# Patient Record
Sex: MALE | Race: OTHER | NOT HISPANIC OR LATINO | URBAN - METROPOLITAN AREA
[De-identification: names, ages, dates, MRNs, and addresses within clinical notes are randomized per-mention and may not be internally consistent; named-entity substitution may affect disease eponyms.]

---

## 2017-08-12 ENCOUNTER — INPATIENT (INPATIENT)
Facility: HOSPITAL | Age: 82
LOS: 5 days | Discharge: HOME | End: 2017-08-18
Attending: INTERNAL MEDICINE | Admitting: INTERNAL MEDICINE
Payer: MEDICAID

## 2017-08-12 VITALS
HEART RATE: 84 BPM | OXYGEN SATURATION: 100 % | DIASTOLIC BLOOD PRESSURE: 85 MMHG | SYSTOLIC BLOOD PRESSURE: 126 MMHG | TEMPERATURE: 98 F | RESPIRATION RATE: 16 BRPM

## 2017-08-12 LAB
ALBUMIN SERPL ELPH-MCNC: 4.4 G/DL — SIGNIFICANT CHANGE UP (ref 3.3–5)
ALP SERPL-CCNC: 95 U/L — SIGNIFICANT CHANGE UP (ref 40–120)
ALT FLD-CCNC: 20 U/L — SIGNIFICANT CHANGE UP (ref 4–41)
APTT BLD: 44.6 SEC — HIGH (ref 27.5–37.4)
AST SERPL-CCNC: 44 U/L — HIGH (ref 4–40)
BASE EXCESS BLDV CALC-SCNC: -7.4 MMOL/L — SIGNIFICANT CHANGE UP
BASOPHILS # BLD AUTO: 0.04 K/UL — SIGNIFICANT CHANGE UP (ref 0–0.2)
BASOPHILS NFR BLD AUTO: 0.7 % — SIGNIFICANT CHANGE UP (ref 0–2)
BILIRUB SERPL-MCNC: 2.4 MG/DL — HIGH (ref 0.2–1.2)
BLOOD GAS VENOUS - CREATININE: 1.32 MG/DL — HIGH (ref 0.5–1.3)
BUN SERPL-MCNC: 26 MG/DL — HIGH (ref 7–23)
CALCIUM SERPL-MCNC: 9.1 MG/DL — SIGNIFICANT CHANGE UP (ref 8.4–10.5)
CHLORIDE BLDV-SCNC: 102 MMOL/L — SIGNIFICANT CHANGE UP (ref 96–108)
CHLORIDE SERPL-SCNC: 96 MMOL/L — LOW (ref 98–107)
CK MB BLD-MCNC: 3.58 NG/ML — SIGNIFICANT CHANGE UP (ref 1–6.6)
CK MB BLD-MCNC: SIGNIFICANT CHANGE UP (ref 0–2.5)
CK SERPL-CCNC: 119 U/L — SIGNIFICANT CHANGE UP (ref 30–200)
CO2 SERPL-SCNC: 17 MMOL/L — LOW (ref 22–31)
CREAT SERPL-MCNC: 1.4 MG/DL — HIGH (ref 0.5–1.3)
D DIMER BLD IA.RAPID-MCNC: 702 NG/ML — SIGNIFICANT CHANGE UP
EOSINOPHIL # BLD AUTO: 0.59 K/UL — HIGH (ref 0–0.5)
EOSINOPHIL NFR BLD AUTO: 9.9 % — HIGH (ref 0–6)
GAS PNL BLDV: 131 MMOL/L — LOW (ref 136–146)
GLUCOSE BLDV-MCNC: 147 — HIGH (ref 70–99)
GLUCOSE SERPL-MCNC: 134 MG/DL — HIGH (ref 70–99)
HCO3 BLDV-SCNC: 17 MMOL/L — LOW (ref 20–27)
HCT VFR BLD CALC: 33.5 % — LOW (ref 39–50)
HCT VFR BLDV CALC: 31.8 % — LOW (ref 39–51)
HGB BLD-MCNC: 9.9 G/DL — LOW (ref 13–17)
HGB BLDV-MCNC: 10.3 G/DL — LOW (ref 13–17)
IMM GRANULOCYTES # BLD AUTO: 0.02 # — SIGNIFICANT CHANGE UP
IMM GRANULOCYTES NFR BLD AUTO: 0.3 % — SIGNIFICANT CHANGE UP (ref 0–1.5)
INR BLD: 1.63 — HIGH (ref 0.88–1.17)
LACTATE BLDV-MCNC: 3.6 MMOL/L — HIGH (ref 0.5–2)
LYMPHOCYTES # BLD AUTO: 1.12 K/UL — SIGNIFICANT CHANGE UP (ref 1–3.3)
LYMPHOCYTES # BLD AUTO: 18.9 % — SIGNIFICANT CHANGE UP (ref 13–44)
MCHC RBC-ENTMCNC: 24.9 PG — LOW (ref 27–34)
MCHC RBC-ENTMCNC: 29.6 % — LOW (ref 32–36)
MCV RBC AUTO: 84.2 FL — SIGNIFICANT CHANGE UP (ref 80–100)
MONOCYTES # BLD AUTO: 1.09 K/UL — HIGH (ref 0–0.9)
MONOCYTES NFR BLD AUTO: 18.4 % — HIGH (ref 2–14)
NEUTROPHILS # BLD AUTO: 3.08 K/UL — SIGNIFICANT CHANGE UP (ref 1.8–7.4)
NEUTROPHILS NFR BLD AUTO: 51.8 % — SIGNIFICANT CHANGE UP (ref 43–77)
NRBC # FLD: 0.02 — SIGNIFICANT CHANGE UP
NT-PROBNP SERPL-SCNC: 3036 PG/ML — SIGNIFICANT CHANGE UP
PCO2 BLDV: 50 MMHG — SIGNIFICANT CHANGE UP (ref 41–51)
PH BLDV: 7.21 PH — LOW (ref 7.32–7.43)
PLATELET # BLD AUTO: 148 K/UL — LOW (ref 150–400)
PMV BLD: 11.2 FL — SIGNIFICANT CHANGE UP (ref 7–13)
PO2 BLDV: 25 MMHG — LOW (ref 35–40)
POTASSIUM BLDV-SCNC: 4.4 MMOL/L — SIGNIFICANT CHANGE UP (ref 3.4–4.5)
POTASSIUM SERPL-MCNC: 4.8 MMOL/L — SIGNIFICANT CHANGE UP (ref 3.5–5.3)
POTASSIUM SERPL-SCNC: 4.8 MMOL/L — SIGNIFICANT CHANGE UP (ref 3.5–5.3)
PROT SERPL-MCNC: 8.8 G/DL — HIGH (ref 6–8.3)
PROTHROM AB SERPL-ACNC: 18.4 SEC — HIGH (ref 9.8–13.1)
RBC # BLD: 3.98 M/UL — LOW (ref 4.2–5.8)
RBC # FLD: 18 % — HIGH (ref 10.3–14.5)
REVIEW TO FOLLOW: YES — SIGNIFICANT CHANGE UP
SAO2 % BLDV: 26.2 % — LOW (ref 60–85)
SODIUM SERPL-SCNC: 130 MMOL/L — LOW (ref 135–145)
TROPONIN T SERPL-MCNC: < 0.06 NG/ML — SIGNIFICANT CHANGE UP (ref 0–0.06)
WBC # BLD: 5.94 K/UL — SIGNIFICANT CHANGE UP (ref 3.8–10.5)
WBC # FLD AUTO: 5.94 K/UL — SIGNIFICANT CHANGE UP (ref 3.8–10.5)

## 2017-08-12 PROCEDURE — 71010: CPT | Mod: 26

## 2017-08-12 RX ORDER — HEPARIN SODIUM 5000 [USP'U]/ML
4500 INJECTION INTRAVENOUS; SUBCUTANEOUS EVERY 6 HOURS
Qty: 0 | Refills: 0 | Status: DISCONTINUED | OUTPATIENT
Start: 2017-08-12 | End: 2017-08-13

## 2017-08-12 RX ORDER — HEPARIN SODIUM 5000 [USP'U]/ML
2000 INJECTION INTRAVENOUS; SUBCUTANEOUS EVERY 6 HOURS
Qty: 0 | Refills: 0 | Status: DISCONTINUED | OUTPATIENT
Start: 2017-08-12 | End: 2017-08-13

## 2017-08-12 RX ORDER — IPRATROPIUM/ALBUTEROL SULFATE 18-103MCG
3 AEROSOL WITH ADAPTER (GRAM) INHALATION ONCE
Qty: 0 | Refills: 0 | Status: COMPLETED | OUTPATIENT
Start: 2017-08-12 | End: 2017-08-12

## 2017-08-12 RX ORDER — ASPIRIN/CALCIUM CARB/MAGNESIUM 324 MG
325 TABLET ORAL ONCE
Qty: 0 | Refills: 0 | Status: COMPLETED | OUTPATIENT
Start: 2017-08-12 | End: 2017-08-12

## 2017-08-12 RX ORDER — FUROSEMIDE 40 MG
80 TABLET ORAL ONCE
Qty: 0 | Refills: 0 | Status: COMPLETED | OUTPATIENT
Start: 2017-08-12 | End: 2017-08-12

## 2017-08-12 RX ORDER — HEPARIN SODIUM 5000 [USP'U]/ML
INJECTION INTRAVENOUS; SUBCUTANEOUS
Qty: 25000 | Refills: 0 | Status: DISCONTINUED | OUTPATIENT
Start: 2017-08-12 | End: 2017-08-13

## 2017-08-12 RX ADMIN — HEPARIN SODIUM 1100 UNIT(S)/HR: 5000 INJECTION INTRAVENOUS; SUBCUTANEOUS at 23:58

## 2017-08-12 RX ADMIN — Medication 325 MILLIGRAM(S): at 23:58

## 2017-08-12 RX ADMIN — Medication 3 MILLILITER(S): at 21:48

## 2017-08-12 RX ADMIN — Medication 80 MILLIGRAM(S): at 22:31

## 2017-08-12 NOTE — ED PROVIDER NOTE - PROGRESS NOTE DETAILS
son phone number: [phone] JW Pt has signs and symptoms of CHF.  BNP >3000, AFIB with no prior history.  Pt at a normal rate.  Discussed with Dr. Li PE unlikely.  Pt admitted to telemetry.  Tele PA text paged.

## 2017-08-12 NOTE — ED PROVIDER NOTE - OBJECTIVE STATEMENT
89 YOM PMH CHF, liver disease NOS, HTN, p/w SOB [place] the [place] one [place] ago from [place]. Sx gradual onset with associated abdominal distension, leg swelling, STACK, and orthopnea.  No chest pain, headache, nausea, vomiting.  No fevers, chills, sweats, weight loss, fatigue, or malaise.

## 2017-08-12 NOTE — ED ADULT NURSE NOTE - CHIEF COMPLAINT QUOTE
Pt comes in for c/o CP/SOB  and in ability to ambulate x3 day. Pt appears in no acute distress, some mild wheezing noted in triage. Family translating for pt and reports there pt arrived from [place] 3 wks ago and was being treated for multiple things there. Pt vs as noted, EKG in completed.

## 2017-08-12 NOTE — ED ADULT NURSE NOTE - OBJECTIVE STATEMENT
89 yo male received in spot 7.  Pt is A&Ox4.  Pt is pavel only speaking.  Translation service offered, family prefers to translate.  Pt c/o CP/SOB for 1 week.  Mild wheezing noted in upper fields.  Pt arrived from [place] 3 wks ago and was being treated for multiple things there.  Pt has multiple medications with that he is not complaint with.  Poor historian of health.  Per family pt has CHF and liver cirrhosis.  Pos 2+ pedal edema.  Pt 18G L ac.  Pt vs as noted,

## 2017-08-12 NOTE — ED PROVIDER NOTE - ATTENDING CONTRIBUTION TO CARE
agree with resident note  89 YOM PMH CHF, liver disease NOS, afib, HTN, p/w SOB.  Recently emigrated from [place] approx 3 weeks and per son has these occassional episodes of SOB "when he gets sick".  States retaining fluid in legs, endorses prior hx of liver disease likely from ETOh.  Pt does endorse a chronic abdominal distension.    PE: initially dyspneic, tachypneic, required immediate oxygen supplementation but when placed on bed and allowed to rest symptoms abated; normotensive (unclear if hypertensive if baseline cirrhotic); tachycardic; rales at bases; abd distended but soft ext: 2+ edema

## 2017-08-12 NOTE — ED PROVIDER NOTE - MEDICAL DECISION MAKING DETAILS
89 YOM PMH CHF, liver disease NOS, HTN, p/w SOB [place] the [place] one [place] ago from [place]. VSS WNL.  Likely CHF given Hx and exam findings.  R/O ACS, evaluate for CHF, evaluate for PE, treat symptomatically.  Admit.

## 2017-08-13 DIAGNOSIS — I48.91 UNSPECIFIED ATRIAL FIBRILLATION: ICD-10-CM

## 2017-08-13 DIAGNOSIS — I50.9 HEART FAILURE, UNSPECIFIED: ICD-10-CM

## 2017-08-13 DIAGNOSIS — Z29.9 ENCOUNTER FOR PROPHYLACTIC MEASURES, UNSPECIFIED: ICD-10-CM

## 2017-08-13 LAB
ANISOCYTOSIS BLD QL: SLIGHT — SIGNIFICANT CHANGE UP
APTT BLD: 162.8 SEC — CRITICAL HIGH (ref 27.5–37.4)
BASOPHILS NFR SPEC: 2.6 % — HIGH (ref 0–2)
BUN SERPL-MCNC: 26 MG/DL — HIGH (ref 7–23)
CALCIUM SERPL-MCNC: 9 MG/DL — SIGNIFICANT CHANGE UP (ref 8.4–10.5)
CHLORIDE SERPL-SCNC: 98 MMOL/L — SIGNIFICANT CHANGE UP (ref 98–107)
CHOLEST SERPL-MCNC: 111 MG/DL — LOW (ref 120–199)
CK SERPL-CCNC: 94 U/L — SIGNIFICANT CHANGE UP (ref 30–200)
CO2 SERPL-SCNC: 16 MMOL/L — LOW (ref 22–31)
CREAT SERPL-MCNC: 1.39 MG/DL — HIGH (ref 0.5–1.3)
EOSINOPHIL NFR FLD: 5.2 % — SIGNIFICANT CHANGE UP (ref 0–6)
GIANT PLATELETS BLD QL SMEAR: PRESENT — SIGNIFICANT CHANGE UP
GLUCOSE SERPL-MCNC: 116 MG/DL — HIGH (ref 70–99)
HBA1C BLD-MCNC: 7.6 % — HIGH (ref 4–5.6)
HCT VFR BLD CALC: 29.1 % — LOW (ref 39–50)
HCT VFR BLD CALC: 29.6 % — LOW (ref 39–50)
HDLC SERPL-MCNC: 38 MG/DL — SIGNIFICANT CHANGE UP (ref 35–55)
HGB BLD-MCNC: 8.7 G/DL — LOW (ref 13–17)
HGB BLD-MCNC: 8.8 G/DL — LOW (ref 13–17)
HYPOCHROMIA BLD QL: SIGNIFICANT CHANGE UP
LIPID PNL WITH DIRECT LDL SERPL: 70 MG/DL — SIGNIFICANT CHANGE UP
LYMPHOCYTES NFR SPEC AUTO: 21.8 % — SIGNIFICANT CHANGE UP (ref 13–44)
MAGNESIUM SERPL-MCNC: 2 MG/DL — SIGNIFICANT CHANGE UP (ref 1.6–2.6)
MCHC RBC-ENTMCNC: 24.5 PG — LOW (ref 27–34)
MCHC RBC-ENTMCNC: 24.8 PG — LOW (ref 27–34)
MCHC RBC-ENTMCNC: 29.7 % — LOW (ref 32–36)
MCHC RBC-ENTMCNC: 29.9 % — LOW (ref 32–36)
MCV RBC AUTO: 82.5 FL — SIGNIFICANT CHANGE UP (ref 80–100)
MCV RBC AUTO: 82.9 FL — SIGNIFICANT CHANGE UP (ref 80–100)
MONOCYTES NFR BLD: 13.9 % — HIGH (ref 2–9)
NEUTROPHIL AB SER-ACNC: 56.5 % — SIGNIFICANT CHANGE UP (ref 43–77)
NRBC # FLD: 0 — SIGNIFICANT CHANGE UP
NRBC # FLD: 0 — SIGNIFICANT CHANGE UP
NT-PROBNP SERPL-SCNC: 2959 PG/ML — SIGNIFICANT CHANGE UP
OB PNL STL: NEGATIVE — SIGNIFICANT CHANGE UP
OB PNL STL: NEGATIVE — SIGNIFICANT CHANGE UP
PHOSPHATE SERPL-MCNC: 3.8 MG/DL — SIGNIFICANT CHANGE UP (ref 2.5–4.5)
PLATELET # BLD AUTO: 135 K/UL — LOW (ref 150–400)
PLATELET # BLD AUTO: 139 K/UL — LOW (ref 150–400)
PLATELET COUNT - ESTIMATE: SIGNIFICANT CHANGE UP
PMV BLD: 11 FL — SIGNIFICANT CHANGE UP (ref 7–13)
PMV BLD: 11 FL — SIGNIFICANT CHANGE UP (ref 7–13)
POIKILOCYTOSIS BLD QL AUTO: SLIGHT — SIGNIFICANT CHANGE UP
POLYCHROMASIA BLD QL SMEAR: SLIGHT — SIGNIFICANT CHANGE UP
POTASSIUM SERPL-MCNC: 4.2 MMOL/L — SIGNIFICANT CHANGE UP (ref 3.5–5.3)
POTASSIUM SERPL-SCNC: 4.2 MMOL/L — SIGNIFICANT CHANGE UP (ref 3.5–5.3)
RBC # BLD: 3.51 M/UL — LOW (ref 4.2–5.8)
RBC # BLD: 3.59 M/UL — LOW (ref 4.2–5.8)
RBC # FLD: 17.9 % — HIGH (ref 10.3–14.5)
RBC # FLD: 18 % — HIGH (ref 10.3–14.5)
SODIUM SERPL-SCNC: 132 MMOL/L — LOW (ref 135–145)
TRIGL SERPL-MCNC: 50 MG/DL — SIGNIFICANT CHANGE UP (ref 10–149)
TROPONIN T SERPL-MCNC: < 0.06 NG/ML — SIGNIFICANT CHANGE UP (ref 0–0.06)
TSH SERPL-MCNC: 2.24 UIU/ML — SIGNIFICANT CHANGE UP (ref 0.27–4.2)
WBC # BLD: 5.53 K/UL — SIGNIFICANT CHANGE UP (ref 3.8–10.5)
WBC # BLD: 5.55 K/UL — SIGNIFICANT CHANGE UP (ref 3.8–10.5)
WBC # FLD AUTO: 5.53 K/UL — SIGNIFICANT CHANGE UP (ref 3.8–10.5)
WBC # FLD AUTO: 5.55 K/UL — SIGNIFICANT CHANGE UP (ref 3.8–10.5)

## 2017-08-13 RX ORDER — HEPARIN SODIUM 5000 [USP'U]/ML
5500 INJECTION INTRAVENOUS; SUBCUTANEOUS EVERY 6 HOURS
Qty: 0 | Refills: 0 | Status: DISCONTINUED | OUTPATIENT
Start: 2017-08-13 | End: 2017-08-15

## 2017-08-13 RX ORDER — DEXTROSE 50 % IN WATER 50 %
12.5 SYRINGE (ML) INTRAVENOUS ONCE
Qty: 0 | Refills: 0 | Status: DISCONTINUED | OUTPATIENT
Start: 2017-08-13 | End: 2017-08-18

## 2017-08-13 RX ORDER — HEPARIN SODIUM 5000 [USP'U]/ML
900 INJECTION INTRAVENOUS; SUBCUTANEOUS
Qty: 25000 | Refills: 0 | Status: DISCONTINUED | OUTPATIENT
Start: 2017-08-13 | End: 2017-08-13

## 2017-08-13 RX ORDER — SODIUM CHLORIDE 9 MG/ML
1000 INJECTION, SOLUTION INTRAVENOUS
Qty: 0 | Refills: 0 | Status: DISCONTINUED | OUTPATIENT
Start: 2017-08-13 | End: 2017-08-18

## 2017-08-13 RX ORDER — METOPROLOL TARTRATE 50 MG
25 TABLET ORAL
Qty: 0 | Refills: 0 | Status: DISCONTINUED | OUTPATIENT
Start: 2017-08-13 | End: 2017-08-14

## 2017-08-13 RX ORDER — DEXTROSE 50 % IN WATER 50 %
1 SYRINGE (ML) INTRAVENOUS ONCE
Qty: 0 | Refills: 0 | Status: DISCONTINUED | OUTPATIENT
Start: 2017-08-13 | End: 2017-08-18

## 2017-08-13 RX ORDER — IPRATROPIUM/ALBUTEROL SULFATE 18-103MCG
3 AEROSOL WITH ADAPTER (GRAM) INHALATION ONCE
Qty: 0 | Refills: 0 | Status: COMPLETED | OUTPATIENT
Start: 2017-08-13 | End: 2017-08-13

## 2017-08-13 RX ORDER — GLUCAGON INJECTION, SOLUTION 0.5 MG/.1ML
1 INJECTION, SOLUTION SUBCUTANEOUS ONCE
Qty: 0 | Refills: 0 | Status: DISCONTINUED | OUTPATIENT
Start: 2017-08-13 | End: 2017-08-18

## 2017-08-13 RX ORDER — INSULIN LISPRO 100/ML
VIAL (ML) SUBCUTANEOUS AT BEDTIME
Qty: 0 | Refills: 0 | Status: DISCONTINUED | OUTPATIENT
Start: 2017-08-13 | End: 2017-08-18

## 2017-08-13 RX ORDER — SODIUM CHLORIDE 9 MG/ML
3 INJECTION INTRAMUSCULAR; INTRAVENOUS; SUBCUTANEOUS EVERY 8 HOURS
Qty: 0 | Refills: 0 | Status: DISCONTINUED | OUTPATIENT
Start: 2017-08-13 | End: 2017-08-18

## 2017-08-13 RX ORDER — HEPARIN SODIUM 5000 [USP'U]/ML
2500 INJECTION INTRAVENOUS; SUBCUTANEOUS EVERY 6 HOURS
Qty: 0 | Refills: 0 | Status: DISCONTINUED | OUTPATIENT
Start: 2017-08-13 | End: 2017-08-15

## 2017-08-13 RX ORDER — DOCUSATE SODIUM 100 MG
100 CAPSULE ORAL THREE TIMES A DAY
Qty: 0 | Refills: 0 | Status: DISCONTINUED | OUTPATIENT
Start: 2017-08-13 | End: 2017-08-18

## 2017-08-13 RX ORDER — INSULIN LISPRO 100/ML
VIAL (ML) SUBCUTANEOUS
Qty: 0 | Refills: 0 | Status: DISCONTINUED | OUTPATIENT
Start: 2017-08-13 | End: 2017-08-18

## 2017-08-13 RX ORDER — DEXTROSE 50 % IN WATER 50 %
25 SYRINGE (ML) INTRAVENOUS ONCE
Qty: 0 | Refills: 0 | Status: DISCONTINUED | OUTPATIENT
Start: 2017-08-13 | End: 2017-08-18

## 2017-08-13 RX ORDER — POLYETHYLENE GLYCOL 3350 17 G/17G
17 POWDER, FOR SOLUTION ORAL DAILY
Qty: 0 | Refills: 0 | Status: DISCONTINUED | OUTPATIENT
Start: 2017-08-13 | End: 2017-08-15

## 2017-08-13 RX ORDER — FUROSEMIDE 40 MG
20 TABLET ORAL EVERY 12 HOURS
Qty: 0 | Refills: 0 | Status: DISCONTINUED | OUTPATIENT
Start: 2017-08-13 | End: 2017-08-15

## 2017-08-13 RX ORDER — ASPIRIN/CALCIUM CARB/MAGNESIUM 324 MG
81 TABLET ORAL DAILY
Qty: 0 | Refills: 0 | Status: DISCONTINUED | OUTPATIENT
Start: 2017-08-13 | End: 2017-08-18

## 2017-08-13 RX ADMIN — SODIUM CHLORIDE 3 MILLILITER(S): 9 INJECTION INTRAMUSCULAR; INTRAVENOUS; SUBCUTANEOUS at 05:47

## 2017-08-13 RX ADMIN — Medication 5 MILLIGRAM(S): at 21:47

## 2017-08-13 RX ADMIN — Medication 20 MILLIGRAM(S): at 17:23

## 2017-08-13 RX ADMIN — Medication 20 MILLIGRAM(S): at 08:32

## 2017-08-13 RX ADMIN — HEPARIN SODIUM 900 UNIT(S)/HR: 5000 INJECTION INTRAVENOUS; SUBCUTANEOUS at 09:05

## 2017-08-13 RX ADMIN — HEPARIN SODIUM 0 UNIT(S)/HR: 5000 INJECTION INTRAVENOUS; SUBCUTANEOUS at 07:24

## 2017-08-13 RX ADMIN — Medication 25 MILLIGRAM(S): at 17:23

## 2017-08-13 RX ADMIN — Medication 81 MILLIGRAM(S): at 12:10

## 2017-08-13 RX ADMIN — SODIUM CHLORIDE 3 MILLILITER(S): 9 INJECTION INTRAMUSCULAR; INTRAVENOUS; SUBCUTANEOUS at 21:46

## 2017-08-13 RX ADMIN — SODIUM CHLORIDE 3 MILLILITER(S): 9 INJECTION INTRAMUSCULAR; INTRAVENOUS; SUBCUTANEOUS at 12:08

## 2017-08-13 RX ADMIN — Medication 100 MILLIGRAM(S): at 14:46

## 2017-08-13 RX ADMIN — Medication 3 MILLILITER(S): at 03:04

## 2017-08-13 RX ADMIN — Medication 100 MILLIGRAM(S): at 21:47

## 2017-08-13 RX ADMIN — Medication 25 MILLIGRAM(S): at 07:31

## 2017-08-13 NOTE — CHART NOTE - NSCHARTNOTEFT_GEN_A_CORE
[facility] called for anemia and elevated INR (not on coumadin per family). rectal exam requested by GI, done at bedside minimal stool in the rectum, sample sent for occult blood. Unable to appreciate for prostate on the exam. Will sent occult

## 2017-08-13 NOTE — PROVIDER CONTACT NOTE (CRITICAL VALUE NOTIFICATION) - BACKGROUND
(Admit Diagnosis) Heart failure  (PMH) CHF (congestive heart failure)  (PMH) Liver disease  (Principal DC/DX) CHF exacerbation

## 2017-08-13 NOTE — H&P ADULT - PROBLEM SELECTOR PLAN 1
CM  F/u CE, EKG, TTE  Monitor I's O's daily weights and Lytes  Give O2, Zaroxolyn 2.5 mg po 30 minutes prior Lasix 20 mg IV BID   F/u Pt and Dietary consults  ASpiration precautions

## 2017-08-13 NOTE — H&P ADULT - RS GEN PE MLT RESP DETAILS PC
rales/clear to auscultation bilaterally/airway patent/good air movement/respirations non-labored/breath sounds equal

## 2017-08-13 NOTE — CONSULT NOTE ADULT - SUBJECTIVE AND OBJECTIVE BOX
Chief Complaint:  Patient is a 89y old  Male who presents with a chief complaint of SOB X 1 monthe (13 Aug 2017 02:07)    HPI: 88yo M with history of CHF, ?cirrhosis (diagnosed and managed in [place]), HTN, who [place] and presented to the ED with shortness of breath, early satiety, lower extremity edema, and abdominal distention and pain. His son is at bedside and explained that his father has been treated for similar symptoms in hospitals in [facility] and in [place] (not at this hospital) very often, approx every 3 months.  He is unclear what treatments and evaluations the patient has had but he has been told he has a weak liver and weak heart.  He states he has never undergone EGD or colonoscopy.  He has no history of GI bleeding (as far as the son knows).  Last bowel movement at 10pm last evening, brown liquid.   Concern on labs for elevated INR and PTT, mild thrombocytopenia, and anemia, for which GI was consulted.  Rectal exam performed by PA prior to my exam was negative for melena (and FOBT negative).    Allergies:  No Known Allergies      Home Medications:    Hospital Medications:  sodium chloride 0.9% lock flush 3 milliLiter(s) IV Push every 8 hours  furosemide   Injectable 20 milliGRAM(s) IV Push every 12 hours  aspirin  chewable 81 milliGRAM(s) Oral daily  metoprolol 25 milliGRAM(s) Oral two times a day  heparin  Injectable 5500 Unit(s) IV Push every 6 hours PRN  heparin  Injectable 2500 Unit(s) IV Push every 6 hours PRN  docusate sodium 100 milliGRAM(s) Oral three times a day  polyethylene glycol 3350 17 Gram(s) Oral daily PRN  guaiFENesin    Syrup 100 milliGRAM(s) Oral every 6 hours PRN  insulin lispro (HumaLOG) corrective regimen sliding scale   SubCutaneous three times a day before meals  insulin lispro (HumaLOG) corrective regimen sliding scale   SubCutaneous at bedtime  dextrose 5%. 1000 milliLiter(s) IV Continuous <Continuous>  dextrose Gel 1 Dose(s) Oral once PRN  dextrose 50% Injectable 12.5 Gram(s) IV Push once  dextrose 50% Injectable 25 Gram(s) IV Push once  dextrose 50% Injectable 25 Gram(s) IV Push once  glucagon  Injectable 1 milliGRAM(s) IntraMuscular once PRN  bisacodyl 5 milliGRAM(s) Oral every 12 hours PRN      PMHX/PSHX:  Liver disease  CHF (congestive heart failure)  No significant past surgical history      Family history:  No pertinent family history      Social History: former ETOH use, no tobacco or drugs.  From [place].    ROS:     General:  No wt loss, fevers, chills, night sweats, fatigue,   Eyes:  Good vision, no reported pain  ENT:  No sore throat, pain, runny nose, dysphagia  CV:  No pain, palpitations, hypo/hypertension  Resp:  No dyspnea, cough, tachypnea, wheezing  GI:  See HPI  :  No pain, bleeding, incontinence, nocturia  Muscle:  No pain, weakness  Neuro:  No weakness, tingling, memory problems  Psych:  No fatigue, insomnia, mood problems, depression  Endocrine:  No polyuria, polydipsia, cold/heat intolerance  Heme:  No petechiae, ecchymosis, easy bruisability  Skin:  No rash, edema      PHYSICAL EXAM:     GENERAL:  Appears stated age, well-groomed, well-nourished, no distress  HEENT:  NC/AT,  conjunctivae clear and pink,  elevated JVD and distended external jugulars  CHEST:  Full & symmetric excursion, no increased effort, breath sounds clear  HEART:  Regular rhythm, S1, S2, no murmur/rub/S3/S4, no abdominal bruit, no edema  ABDOMEN:  Soft, distended, mildly TTP in epigastrium , normal BS  EXTREMITIES:  3+ bilateral lower extremity edema  SKIN:  No rash/erythema/ecchymoses/petechiae/wounds/abscess/warm/dry  NEURO:  Alert, oriented    Vital Signs:  Vital Signs Last 24 Hrs  T(C): 36.7 (13 Aug 2017 18:07), Max: 36.8 (12 Aug 2017 22:37)  T(F): 98 (13 Aug 2017 18:07), Max: 98.2 (12 Aug 2017 22:37)  HR: 70 (13 Aug 2017 18:07) (68 - 88)  BP: 120/77 (13 Aug 2017 18:07) (102/77 - 130/80)  BP(mean): --  RR: 18 (13 Aug 2017 18:07) (16 - 18)  SpO2: 98% (13 Aug 2017 18:07) (98% - 100%)  Daily Height in cm: 167.64 (13 Aug 2017 13:37)    Daily Weight in k.2 (13 Aug 2017 05:46)    LABS:                        8.8    5.55  )-----------( 139      ( 13 Aug 2017 11:15 )             29.6     08    132<L>  |  98  |  26<H>  ----------------------------<  116<H>  4.2   |  16<L>  |  1.39<H>    Ca    9.0      13 Aug 2017 05:30  Phos  3.8       Mg     2.0         TPro  8.8<H>  /  Alb  4.4  /  TBili  2.4<H>  /  DBili  x   /  AST  44<H>  /  ALT  20  /  AlkPhos  95  0812    LIVER FUNCTIONS - ( 12 Aug 2017 21:15 )  Alb: 4.4 g/dL / Pro: 8.8 g/dL / ALK PHOS: 95 u/L / ALT: 20 u/L / AST: 44 u/L / GGT: x           PT/INR - ( 12 Aug 2017 21:15 )   PT: 18.4 SEC;   INR: 1.63          PTT - ( 13 Aug 2017 05:30 )  PTT:162.8 SEC        Imaging:      CXR with pulm edema

## 2017-08-13 NOTE — PATIENT PROFILE ADULT. - INTERPRETER'S NAME
Rojelio states that there are no [facility] interpreters available at this time, will try to call after 0500 as advised   Rojelio states that there are no [facility] interpreters available at this time, will try to call in the morning as advised

## 2017-08-13 NOTE — PHYSICAL THERAPY INITIAL EVALUATION ADULT - GENERAL OBSERVATIONS, REHAB EVAL
Patient received sitting in a chair, in NAD. + Abdominal distension and Bilateral LE hyperpigmentation Patient received sitting in a chair, in NAD. + Abdominal distension and Bilateral LE hyperpigmentation. (Language Line used for Antwon: [id number])

## 2017-08-13 NOTE — CONSULT NOTE ADULT - ATTENDING COMMENTS
An 89 year-old man with dyspnea, leg edema, new onset atrial fibrillation and  heart failure is being consulted for cryptogenic cirrhosis and abnormal liver tests and prolonged INR.   Patient is awake, alert and oriented x3. He has ascites.   Will recommend workup for chronic liver disease and cirrhosis, an abdominal US with doppler to rule out PVT, and  BNP, diagnostic paracentesis to calculate SAAG and total protein, and an echocardiogram to rule out right heart dysfunction and congestive hepatopathy or cardiac cirrhosis.

## 2017-08-13 NOTE — H&P ADULT - HISTORY OF PRESENT ILLNESS
89M with a history of CHF, liver disease, HTN, experiencing SOB recently [place] the [place] one [place] ago from [place]. The symptoms had gradual onset with associated abdominal distension, B/L leg swelling, STACK, and orthopnea.  No chest pain, headache, nausea, vomiting.  No fevers, chills, sweats, weight loss, fatigue, or malaise.    In th Ed, the pt is found to be in new set rate controlled A fib and fluid over load.  The pt is sated on Lasix IV and a Heparin drip

## 2017-08-13 NOTE — PHYSICAL THERAPY INITIAL EVALUATION ADULT - PERTINENT HX OF CURRENT PROBLEM, REHAB EVAL
89M with a history of CHF, liver disease, HTN, experiencing SOB recently [place] the [place] one [place] ago from [place]. The symptoms had gradual onset with associated abdominal distension, B/L leg swelling, STACK, and orthopnea.

## 2017-08-13 NOTE — CONSULT NOTE ADULT - ASSESSMENT
Impression:  1) Normocytic anemia - no overt GI bleeding, likely in setting of chronic inflammation  2) Volume overload - ddx includes decompensated heart failure vs less likely decompensated cirrhosis  3) Elevated liver enzymes and transaminsases - could be congestive hepatopathy vs 2/2 cirrhosis  4) cirrhosis - possibly ETOH but do not know etiology or extent of disease  5) new onset afib    Recs:  - check iron panel, ferritin for anemia eval  - do not think patient is having occult GI bleeding but may benefit from EGD/colon given history of cirrhosis and never having had in past, not urgent.  Will discuss in AM  - would optimize volume status with diuresis  - cardiac eval for dx of CHF and afib  - would followup abdominal US to eval liver, ascites  - can check hepatitis panel HbsAg, HbcAb, HbsAb, HCV Ab, HAV IgM/IgG, but would consider outpatient management of chronic liver disease  - Avoid hepatotoxic agents  - Daily INR/CMP

## 2017-08-13 NOTE — H&P ADULT - NSHPLABSRESULTS_GEN_ALL_CORE
CXR Left retrocardiac opacity may represent atelectasis or infection  EKG A fib @ 84b/ min, RBBB, Q wave 2,3, AVF  On HEPARIN DRIP   On LASIX IV  H & H = 9.9/ 33.5  PT/ INR/ PTT= 18.4/ 1.63/ 44.6  D DDimer= 702  Na/ Cl= 130/ 96  BUN/ creatinine= 26/ 1.4  Glucose = 131  Ce negative x 1  ProBNP = 3036

## 2017-08-13 NOTE — PHYSICAL THERAPY INITIAL EVALUATION ADULT - DISCHARGE DISPOSITION, PT EVAL
Anticipate Home PT with [facility] pending further improvements in gait and stair training for patient safety

## 2017-08-13 NOTE — H&P ADULT - PROBLEM SELECTOR PLAN 2
Rate controlled  Will start low dose BB with parameters due to volume removal.  Continue Heparin drip started in the Ed  Fall precautions

## 2017-08-13 NOTE — PHYSICAL THERAPY INITIAL EVALUATION ADULT - ACTIVE RANGE OF MOTION EXAMINATION, REHAB EVAL
fran. upper extremity Active ROM was WNL (within normal limits)/bilateral lower extremity Active ROM was WNL (within normal limits)

## 2017-08-13 NOTE — H&P ADULT - ATTENDING COMMENTS
Patient seen and examined, agree with the above assessment and plan by the PA with the following additions/exceptions.    Pt is an 88 yo male from [place] with PMH of CHF, liver disease(cirrhotic?), presenting with decompensated CHF and atrial fibrillation.  Pt start on IV diuresis and anticoagulation.  He feels a little better, no new complaints.    Vitals are stable  Tele rate controlled atrial fibrillation    exam significant bibasilar rales R> L, mild abdominal distension, no peripheral edema    labs: anemia which is trending downward          elevated INR suggestive of liver disease?unclear if pt on coumadin          mild CKD           trops negatibe          eleavted proBNP    A/P Acute/Chronic CHF        AFIB        Liver Disease        CKD    -CV status appears stable  -cont IV lasix  -DC zaroxolyn  -given baseline elevated INR, worsening anemia and possible liver disease, will hold heparin for now, start low dose aspirin  -please clarify if pt was on coumadin, if not please obtain a GI consult for anemia in the setting of liver disease  -ECHO

## 2017-08-14 LAB
ALBUMIN SERPL ELPH-MCNC: 3.9 G/DL — SIGNIFICANT CHANGE UP (ref 3.3–5)
ALP SERPL-CCNC: 84 U/L — SIGNIFICANT CHANGE UP (ref 40–120)
ALT FLD-CCNC: 19 U/L — SIGNIFICANT CHANGE UP (ref 4–41)
AST SERPL-CCNC: 34 U/L — SIGNIFICANT CHANGE UP (ref 4–40)
BILIRUB DIRECT SERPL-MCNC: 0.8 MG/DL — HIGH (ref 0.1–0.2)
BILIRUB SERPL-MCNC: 2 MG/DL — HIGH (ref 0.2–1.2)
BUN SERPL-MCNC: 36 MG/DL — HIGH (ref 7–23)
CALCIUM SERPL-MCNC: 9.4 MG/DL — SIGNIFICANT CHANGE UP (ref 8.4–10.5)
CHLORIDE SERPL-SCNC: 95 MMOL/L — LOW (ref 98–107)
CO2 SERPL-SCNC: 20 MMOL/L — LOW (ref 22–31)
CREAT SERPL-MCNC: 1.69 MG/DL — HIGH (ref 0.5–1.3)
GLUCOSE SERPL-MCNC: 122 MG/DL — HIGH (ref 70–99)
HAV IGG+IGM SER QL: REACTIVE — SIGNIFICANT CHANGE UP
HAV IGM SER-ACNC: NONREACTIVE — SIGNIFICANT CHANGE UP
HBV CORE IGM SER-ACNC: NONREACTIVE — SIGNIFICANT CHANGE UP
HBV SURFACE AG SER-ACNC: NONREACTIVE — SIGNIFICANT CHANGE UP
HCT VFR BLD CALC: 29.2 % — LOW (ref 39–50)
HCV AB S/CO SERPL IA: 0.1 S/CO — SIGNIFICANT CHANGE UP
HCV AB SERPL-IMP: SIGNIFICANT CHANGE UP
HGB BLD-MCNC: 8.9 G/DL — LOW (ref 13–17)
MAGNESIUM SERPL-MCNC: 1.7 MG/DL — SIGNIFICANT CHANGE UP (ref 1.6–2.6)
MCHC RBC-ENTMCNC: 24.8 PG — LOW (ref 27–34)
MCHC RBC-ENTMCNC: 30.5 % — LOW (ref 32–36)
MCV RBC AUTO: 81.3 FL — SIGNIFICANT CHANGE UP (ref 80–100)
NRBC # FLD: 0 — SIGNIFICANT CHANGE UP
PLATELET # BLD AUTO: 140 K/UL — LOW (ref 150–400)
PMV BLD: 11.4 FL — SIGNIFICANT CHANGE UP (ref 7–13)
POTASSIUM SERPL-MCNC: 3.7 MMOL/L — SIGNIFICANT CHANGE UP (ref 3.5–5.3)
POTASSIUM SERPL-SCNC: 3.7 MMOL/L — SIGNIFICANT CHANGE UP (ref 3.5–5.3)
PROT SERPL-MCNC: 7.8 G/DL — SIGNIFICANT CHANGE UP (ref 6–8.3)
RBC # BLD: 3.59 M/UL — LOW (ref 4.2–5.8)
RBC # FLD: 18 % — HIGH (ref 10.3–14.5)
SODIUM SERPL-SCNC: 132 MMOL/L — LOW (ref 135–145)
WBC # BLD: 5.78 K/UL — SIGNIFICANT CHANGE UP (ref 3.8–10.5)
WBC # FLD AUTO: 5.78 K/UL — SIGNIFICANT CHANGE UP (ref 3.8–10.5)

## 2017-08-14 PROCEDURE — 99233 SBSQ HOSP IP/OBS HIGH 50: CPT | Mod: GC

## 2017-08-14 PROCEDURE — 76700 US EXAM ABDOM COMPLETE: CPT | Mod: 26

## 2017-08-14 RX ORDER — LANOLIN ALCOHOL/MO/W.PET/CERES
3 CREAM (GRAM) TOPICAL ONCE
Qty: 0 | Refills: 0 | Status: COMPLETED | OUTPATIENT
Start: 2017-08-14 | End: 2017-08-14

## 2017-08-14 RX ORDER — POLYETHYLENE GLYCOL 3350 17 G/17G
17 POWDER, FOR SOLUTION ORAL ONCE
Qty: 0 | Refills: 0 | Status: COMPLETED | OUTPATIENT
Start: 2017-08-14 | End: 2017-08-14

## 2017-08-14 RX ORDER — IPRATROPIUM/ALBUTEROL SULFATE 18-103MCG
3 AEROSOL WITH ADAPTER (GRAM) INHALATION ONCE
Qty: 0 | Refills: 0 | Status: COMPLETED | OUTPATIENT
Start: 2017-08-14 | End: 2017-08-14

## 2017-08-14 RX ADMIN — SODIUM CHLORIDE 3 MILLILITER(S): 9 INJECTION INTRAMUSCULAR; INTRAVENOUS; SUBCUTANEOUS at 14:35

## 2017-08-14 RX ADMIN — Medication 100 MILLIGRAM(S): at 11:24

## 2017-08-14 RX ADMIN — SODIUM CHLORIDE 3 MILLILITER(S): 9 INJECTION INTRAMUSCULAR; INTRAVENOUS; SUBCUTANEOUS at 22:43

## 2017-08-14 RX ADMIN — Medication 81 MILLIGRAM(S): at 11:24

## 2017-08-14 RX ADMIN — Medication 20 MILLIGRAM(S): at 05:54

## 2017-08-14 RX ADMIN — Medication 100 MILLIGRAM(S): at 22:44

## 2017-08-14 RX ADMIN — Medication 3 MILLIGRAM(S): at 01:30

## 2017-08-14 RX ADMIN — SODIUM CHLORIDE 3 MILLILITER(S): 9 INJECTION INTRAMUSCULAR; INTRAVENOUS; SUBCUTANEOUS at 05:54

## 2017-08-14 RX ADMIN — Medication 25 MILLIGRAM(S): at 05:54

## 2017-08-14 RX ADMIN — Medication 3 MILLILITER(S): at 07:32

## 2017-08-14 RX ADMIN — Medication 100 MILLIGRAM(S): at 05:54

## 2017-08-14 RX ADMIN — Medication 20 MILLIGRAM(S): at 17:36

## 2017-08-14 RX ADMIN — POLYETHYLENE GLYCOL 3350 17 GRAM(S): 17 POWDER, FOR SOLUTION ORAL at 09:53

## 2017-08-14 NOTE — DIETITIAN INITIAL EVALUATION ADULT. - NS AS NUTRI INTERV MEALS SNACK
1. Suggest: PO diet rx: Lithia-Soft, Consistent Carbohydrate (no snacks), DASH/TLC (cholesterol and sodium restricted), Low Fat; PO supplement: Glucerna Shake 8oz. 1x daily (will provide additional ~220kcals, ~10g protein);/Other (specify)/Diets modified for specific foods and ingredients

## 2017-08-14 NOTE — PROGRESS NOTE ADULT - SUBJECTIVE AND OBJECTIVE BOX
CARDIOLOGY FOLLOW UP NOTE - DR. ROBLEDO    Subjective:  less dypsnea    no cp  no abd pain    PHYSICAL EXAM:  T(C): 36.4 (08-14-17 @ 13:00), Max: 36.7 (08-13-17 @ 18:07)  HR: 74 (08-14-17 @ 13:00) (70 - 82)  BP: 102/71 (08-14-17 @ 13:00) (102/71 - 128/91)  RR: 17 (08-14-17 @ 13:00) (17 - 18)  SpO2: 100% (08-14-17 @ 13:00) (95% - 100%)  Wt(kg): --  I&O's Summary    13 Aug 2017 07:01  -  14 Aug 2017 07:00  --------------------------------------------------------  IN: 820 mL / OUT: 1180 mL / NET: -360 mL    14 Aug 2017 07:01  -  14 Aug 2017 16:03  --------------------------------------------------------  IN: 320 mL / OUT: 0 mL / NET: 320 mL        Appearance: Normal	  Cardiovascular: Normal S1 S2,irreg  Respiratory: Lungs clear to auscultation	  Gastrointestinal:  Soft, Non-tender, + BS	  Extremities: Normal range of motion, b/l edema 1-2+      MEDICATIONS  (STANDING):  sodium chloride 0.9% lock flush 3 milliLiter(s) IV Push every 8 hours  furosemide   Injectable 20 milliGRAM(s) IV Push every 12 hours  aspirin  chewable 81 milliGRAM(s) Oral daily  docusate sodium 100 milliGRAM(s) Oral three times a day  insulin lispro (HumaLOG) corrective regimen sliding scale   SubCutaneous three times a day before meals  insulin lispro (HumaLOG) corrective regimen sliding scale   SubCutaneous at bedtime  dextrose 5%. 1000 milliLiter(s) (50 mL/Hr) IV Continuous <Continuous>  dextrose 50% Injectable 12.5 Gram(s) IV Push once  dextrose 50% Injectable 25 Gram(s) IV Push once  dextrose 50% Injectable 25 Gram(s) IV Push once      TELEMETRY: 	    ECG:  	  RADIOLOGY:   DIAGNOSTIC TESTING:  [ ] Echocardiogram:  [ ] Catheterization:  [ ] Stress Test:    OTHER: 	    LABS:	 	    CARDIAC MARKERS:                                8.9    5.78  )-----------( 140      ( 14 Aug 2017 06:00 )             29.2     08-14    132<L>  |  95<L>  |  36<H>  ----------------------------<  122<H>  3.7   |  20<L>  |  1.69<H>    Ca    9.4      14 Aug 2017 06:00  Phos  3.8     08-13  Mg     1.7     08-14    TPro  7.8  /  Alb  3.9  /  TBili  2.0<H>  /  DBili  0.8<H>  /  AST  34  /  ALT  19  /  AlkPhos  84  08-14    proBNP:   PT/INR - ( 12 Aug 2017 21:15 )   PT: 18.4 SEC;   INR: 1.63          PTT - ( 13 Aug 2017 05:30 )  PTT:162.8 SEC  Lipid Profile:   HgA1c:

## 2017-08-14 NOTE — DIETITIAN INITIAL EVALUATION ADULT. - DIET TYPE
low sodium/consistent carbohydrate (no snacks)/DASH/TLC (sodium and cholesterol restricted diet)/regular/60 gm protein

## 2017-08-14 NOTE — DIETITIAN INITIAL EVALUATION ADULT. - PERTINENT LABORATORY DATA
(8/14) H/H 8.9/29.2 L,  L, Na 132 L, Cl 95 L, BUN 36 H, Creat 1.69 H , Glu 122 H, Albumin 3.9;           (8/13) HbA1c 7.6% H, Cholesterol 111 L

## 2017-08-14 NOTE — DIETITIAN INITIAL EVALUATION ADULT. - SOURCE
family/significant other/other (specify)/Pt speaks Gaby, Pt's son @ bed side helped with translation and participated in interview, Medical Chart/patient

## 2017-08-14 NOTE — DIETITIAN INITIAL EVALUATION ADULT. - OTHER INFO
Nutrition Consult X CHF exacerbation. Pt 88 yo male appears alert, oriented. Pt brandee Griffin. Pt's son @ bed side helped with translation; Pt's son participated in the interview as well. Pt's appetite has been poor for past 2-3 years 2/2 abdominal discomfort/gas. No chew/swallow problem voiced; no nausea/vomiting/diarrhea reported @ present. But Pt C/O constipation. Food preferences discussed. Nutrition Consult X CHF exacerbation. Pt 89 yo male appears alert, oriented. Pt speaks Gaby. Pt's son @ bed side helped with translation; Pt's son participated in the interview as well. Pt's appetite has been poor for past 2-3 years 2/2 abdominal discomfort/gas. No chew/swallow problem voiced; no nausea/vomiting/diarrhea reported @ present. But Pt C/O constipation. Per Pt his weight: 52 Kg not consistent with dosing weight: 68.2 Kg. Pt also stated he lost weight, but unable to quantify. Food preferences discussed.

## 2017-08-14 NOTE — PROGRESS NOTE ADULT - ASSESSMENT
89 year old man with history of CHF, liver disease(cirrhotic?), admitted with decompensated CHF and atrial fibrillation.        1. Acute/Chronic CHF  volume status mildly improved  continue iv lasix as ordered  if no significantly negative, will increase to 40mg Iv  f/u echo   r/o cmp, valve disease    2. AFIB  bradycardia noted on bb  hold bb for now  cont to monitor on tele  f/u echo   hold a/c, pt with elevated inr sec to liver dysfxn  asa only     3. Liver Disease   ascites  ? sec to RHF    gi f/u  ? diagnostic abd tap         4. CKD  NAWAF  cont to monitor  check u/a for protein    5. anemia   cont to monitor    dvt ppx

## 2017-08-15 LAB
A1AT SERPL-MCNC: 180 MG/DL — SIGNIFICANT CHANGE UP (ref 90–200)
ANA PAT FLD IF-IMP: SIGNIFICANT CHANGE UP
ANA TITR SER: SIGNIFICANT CHANGE UP
APPEARANCE UR: CLEAR — SIGNIFICANT CHANGE UP
BILIRUB UR-MCNC: NEGATIVE — SIGNIFICANT CHANGE UP
BLOOD UR QL VISUAL: NEGATIVE — SIGNIFICANT CHANGE UP
BUN SERPL-MCNC: 45 MG/DL — HIGH (ref 7–23)
CALCIUM SERPL-MCNC: 9.3 MG/DL — SIGNIFICANT CHANGE UP (ref 8.4–10.5)
CHLORIDE SERPL-SCNC: 95 MMOL/L — LOW (ref 98–107)
CO2 SERPL-SCNC: 21 MMOL/L — LOW (ref 22–31)
COLOR SPEC: SIGNIFICANT CHANGE UP
CREAT SERPL-MCNC: 1.7 MG/DL — HIGH (ref 0.5–1.3)
FERRITIN SERPL-MCNC: 61.25 NG/ML — SIGNIFICANT CHANGE UP (ref 30–400)
GLUCOSE SERPL-MCNC: 115 MG/DL — HIGH (ref 70–99)
GLUCOSE UR-MCNC: NEGATIVE — SIGNIFICANT CHANGE UP
HCT VFR BLD CALC: 28.6 % — LOW (ref 39–50)
HGB BLD-MCNC: 8.6 G/DL — LOW (ref 13–17)
INR BLD: 1.62 — HIGH (ref 0.88–1.17)
IRON SATN MFR SERPL: 18 UG/DL — LOW (ref 45–165)
IRON SATN MFR SERPL: 322 UG/DL — SIGNIFICANT CHANGE UP (ref 155–535)
KETONES UR-MCNC: NEGATIVE — SIGNIFICANT CHANGE UP
LEUKOCYTE ESTERASE UR-ACNC: NEGATIVE — SIGNIFICANT CHANGE UP
MAGNESIUM SERPL-MCNC: 1.6 MG/DL — SIGNIFICANT CHANGE UP (ref 1.6–2.6)
MCHC RBC-ENTMCNC: 24.2 PG — LOW (ref 27–34)
MCHC RBC-ENTMCNC: 30.1 % — LOW (ref 32–36)
MCV RBC AUTO: 80.3 FL — SIGNIFICANT CHANGE UP (ref 80–100)
NITRITE UR-MCNC: NEGATIVE — SIGNIFICANT CHANGE UP
NON-SQ EPI CELLS # UR AUTO: <1 — SIGNIFICANT CHANGE UP
NRBC # FLD: 0.02 — SIGNIFICANT CHANGE UP
PH UR: 6 — SIGNIFICANT CHANGE UP (ref 4.6–8)
PLATELET # BLD AUTO: 137 K/UL — LOW (ref 150–400)
PMV BLD: 11.2 FL — SIGNIFICANT CHANGE UP (ref 7–13)
POTASSIUM SERPL-MCNC: 3.4 MMOL/L — LOW (ref 3.5–5.3)
POTASSIUM SERPL-SCNC: 3.4 MMOL/L — LOW (ref 3.5–5.3)
PROT UR-MCNC: NEGATIVE — SIGNIFICANT CHANGE UP
PROTHROM AB SERPL-ACNC: 18.3 SEC — HIGH (ref 9.8–13.1)
RBC # BLD: 3.56 M/UL — LOW (ref 4.2–5.8)
RBC # FLD: 18.1 % — HIGH (ref 10.3–14.5)
RBC CASTS # UR COMP ASSIST: SIGNIFICANT CHANGE UP (ref 0–?)
SODIUM SERPL-SCNC: 135 MMOL/L — SIGNIFICANT CHANGE UP (ref 135–145)
SP GR SPEC: 1.01 — SIGNIFICANT CHANGE UP (ref 1–1.03)
UIBC SERPL-MCNC: 304 UG/DL — SIGNIFICANT CHANGE UP (ref 110–370)
UROBILINOGEN FLD QL: NORMAL E.U. — SIGNIFICANT CHANGE UP (ref 0.1–0.2)
WBC # BLD: 6.03 K/UL — SIGNIFICANT CHANGE UP (ref 3.8–10.5)
WBC # FLD AUTO: 6.03 K/UL — SIGNIFICANT CHANGE UP (ref 3.8–10.5)

## 2017-08-15 PROCEDURE — 99232 SBSQ HOSP IP/OBS MODERATE 35: CPT | Mod: GC

## 2017-08-15 RX ORDER — POTASSIUM CHLORIDE 20 MEQ
40 PACKET (EA) ORAL ONCE
Qty: 0 | Refills: 0 | Status: COMPLETED | OUTPATIENT
Start: 2017-08-15 | End: 2017-08-15

## 2017-08-15 RX ORDER — POLYETHYLENE GLYCOL 3350 17 G/17G
17 POWDER, FOR SOLUTION ORAL DAILY
Qty: 0 | Refills: 0 | Status: DISCONTINUED | OUTPATIENT
Start: 2017-08-15 | End: 2017-08-18

## 2017-08-15 RX ORDER — FUROSEMIDE 40 MG
40 TABLET ORAL EVERY 12 HOURS
Qty: 0 | Refills: 0 | Status: DISCONTINUED | OUTPATIENT
Start: 2017-08-15 | End: 2017-08-18

## 2017-08-15 RX ADMIN — Medication: at 13:37

## 2017-08-15 RX ADMIN — Medication 100 MILLIGRAM(S): at 21:23

## 2017-08-15 RX ADMIN — Medication 100 MILLIGRAM(S): at 05:11

## 2017-08-15 RX ADMIN — SODIUM CHLORIDE 3 MILLILITER(S): 9 INJECTION INTRAMUSCULAR; INTRAVENOUS; SUBCUTANEOUS at 13:37

## 2017-08-15 RX ADMIN — Medication 1: at 17:29

## 2017-08-15 RX ADMIN — POLYETHYLENE GLYCOL 3350 17 GRAM(S): 17 POWDER, FOR SOLUTION ORAL at 17:30

## 2017-08-15 RX ADMIN — SODIUM CHLORIDE 3 MILLILITER(S): 9 INJECTION INTRAMUSCULAR; INTRAVENOUS; SUBCUTANEOUS at 21:23

## 2017-08-15 RX ADMIN — Medication 40 MILLIEQUIVALENT(S): at 11:00

## 2017-08-15 RX ADMIN — Medication 81 MILLIGRAM(S): at 11:00

## 2017-08-15 RX ADMIN — SODIUM CHLORIDE 3 MILLILITER(S): 9 INJECTION INTRAMUSCULAR; INTRAVENOUS; SUBCUTANEOUS at 05:10

## 2017-08-15 RX ADMIN — Medication 100 MILLIGRAM(S): at 11:01

## 2017-08-15 RX ADMIN — Medication 40 MILLIGRAM(S): at 17:29

## 2017-08-15 RX ADMIN — Medication 20 MILLIGRAM(S): at 05:11

## 2017-08-15 NOTE — PROGRESS NOTE ADULT - SUBJECTIVE AND OBJECTIVE BOX
Chief Complaint:  Patient is a 89y old  Male who presents with a chief complaint of SOB X 1 monthe (13 Aug 2017 02:07)      Interval Events: 89 yo M with PMH of CHF, Chronic Anemia, ? Cryptogenic Cirrhosis Possible cardiac etiology admitted for CHF and new onset a Fib and being followed by Hepatology.   No events overnight, did not have bowel movement in last 4 days on colace, Dulcolax and Miralax.     Allergies:  No Known Allergies      Hospital Medications:  sodium chloride 0.9% lock flush 3 milliLiter(s) IV Push every 8 hours  furosemide   Injectable 20 milliGRAM(s) IV Push every 12 hours  aspirin  chewable 81 milliGRAM(s) Oral daily  heparin  Injectable 5500 Unit(s) IV Push every 6 hours PRN  heparin  Injectable 2500 Unit(s) IV Push every 6 hours PRN  docusate sodium 100 milliGRAM(s) Oral three times a day  polyethylene glycol 3350 17 Gram(s) Oral daily PRN  guaiFENesin    Syrup 100 milliGRAM(s) Oral every 6 hours PRN  insulin lispro (HumaLOG) corrective regimen sliding scale   SubCutaneous three times a day before meals  insulin lispro (HumaLOG) corrective regimen sliding scale   SubCutaneous at bedtime  dextrose 5%. 1000 milliLiter(s) IV Continuous <Continuous>  dextrose Gel 1 Dose(s) Oral once PRN  dextrose 50% Injectable 12.5 Gram(s) IV Push once  dextrose 50% Injectable 25 Gram(s) IV Push once  dextrose 50% Injectable 25 Gram(s) IV Push once  glucagon  Injectable 1 milliGRAM(s) IntraMuscular once PRN  bisacodyl 5 milliGRAM(s) Oral every 12 hours PRN      PMHX/PSHX:  Liver disease  CHF (congestive heart failure)  No significant past surgical history      Family history:  No pertinent family history in first degree relatives      ROS:     General:  No fevers, chills, night sweats, fatigue,   ENT:  No  dysphagia, does  c/o loss of appetite.  CV:  No pain, palpitations, hypo/hypertension  Resp:  Dyspnea improved, No cough, tachypnea, wheezing  GI:  See HPI  :  No pain, bleeding, incontinence, nocturia  Muscle:  No pain, weakness  Heme:  No petechiae, ecchymosis, easy bruisability  Skin:  No rash, B/L pedal edema.      PHYSICAL EXAM:     GENERAL:  Appears stated age, no distress, lying comfortably in the bed.  CHEST:  Full & symmetric excursion, basal crepts but comfortable on room air.  HEART:  Regular rhythm, S1, S2, no murmur  ABDOMEN:  Soft, non-tender, non-distended, normoactive bowel sounds,  EXTREMITIES:  no cyanosis, clubbing. B/L pedal edema  SKIN:  No rash/erythema/ecchymoses/petechiae/wounds/abscess/warm/dry  NEURO:  Alert, oriented, non focal, no asterixis.    Vital Signs:  Vital Signs Last 24 Hrs  T(C): 36.3 (15 Aug 2017 05:06), Max: 36.7 (14 Aug 2017 17:38)  T(F): 97.4 (15 Aug 2017 05:06), Max: 98 (14 Aug 2017 17:38)  HR: 68 (15 Aug 2017 05:06) (67 - 74)  BP: 110/67 (15 Aug 2017 05:06) (102/71 - 122/78)  BP(mean): --  RR: 17 (15 Aug 2017 05:06) (17 - 18)  SpO2: 100% (15 Aug 2017 05:06) (98% - 100%)  Daily     Daily Weight in k.5 (15 Aug 2017 05:06)    LABS:                        8.6    6.03  )-----------( 137      ( 15 Aug 2017 07:00 )             28.6     08-14    132<L>  |  95<L>  |  36<H>  ----------------------------<  122<H>  3.7   |  20<L>  |  1.69<H>    Ca    9.4      14 Aug 2017 06:00  Mg     1.7     08-14    TPro  7.8  /  Alb  3.9  /  TBili  2.0<H>  /  DBili  0.8<H>  /  AST  34  /  ALT  19  /  AlkPhos  84  -    LIVER FUNCTIONS - ( 14 Aug 2017 06:00 )  Alb: 3.9 g/dL / Pro: 7.8 g/dL / ALK PHOS: 84 u/L / ALT: 19 u/L / AST: 34 u/L / GGT: x           PT/INR - ( 15 Aug 2017 07:00 )   PT: 18.3 SEC;   INR: 1.62     Anti HAV IgM neg,  HbsAg Negative  Anti HCV Neagtive             Imaging: < from: US Abdomen Complete (17 @ 14:55) >  Cirrhosis, likely cardiogenic.  Marked dilatation of the inferior vena cava consistent with severe CHF.  Bilateral pleural effusions and small amount ascites.  Spleen with in normal limits.    < end of copied text >

## 2017-08-15 NOTE — PROGRESS NOTE ADULT - ASSESSMENT
89 yo M with PMH of CHF, Chronic Anemia, ? Cryptogenic Cirrhosis Possible cardiac etiology admitted for CHF and new onset A Fib being followed by Hepatology for Cirrhosis and Volume overload.  1 :  Cirrhosis : likely due to CHF, Volume status and symptoms improving on Diuretics, Low salt Diet, Lasix 20 mg IV Q12 H        Viral serologies negative. U/S revealed cirrhosis, dilated IVC and normal size spleen.         Beta blocker held due to episodes of bradycardia.  2 ': Constipation ; - on Colace, Dulcolax and Miralax.                             - Please use Miralax daily rather than prn.  3 : Ascites : Minimal ascites on physical exam and on U/S.                     - May not be enough to tap.  4 : A Fib: plan as per cardiology, pending echo. 89 yo M with PMH of CHF, Chronic Anemia, ? Cryptogenic Cirrhosis Possible cardiac etiology admitted for CHF and new onset A Fib being followed by Hepatology for Cirrhosis and Volume overload.  1 :  Cirrhosis : likely due to CHF, Volume status and symptoms improving on Diuretics, Low salt Diet, Lasix 20 mg IV Q12 H        Viral serologies negative. U/S revealed cirrhosis, dilated IVC and normal size spleen.         Beta blocker held due to episodes of bradycardia.  2 ': Constipation ; - on Colace, Dulcolax and Miralax.                             - Please use Miralax daily rather than prn.  3 : Ascites : Minimal ascites on physical exam and on U/S.                     - May not be enough to tap.  4 : Chronic Anemia : need iron studies, Vitamin B12 level, Folate.     - EGD and Colonoscopy depends upon patient cardiac status , pending echo results.  4 : A Fib & CHF : plan as per cardiology, pending echo.

## 2017-08-15 NOTE — PROGRESS NOTE ADULT - SUBJECTIVE AND OBJECTIVE BOX
Cardiovascular Disease Progress Note  (For Dr. Li)    Overnight events: No acute events overnight. Mr. Owen denies chest pain. SOB mildly improved.   Otherwise review of systems negative    Objective Findings:  T(C): 36.3 (08-15-17 @ 05:06), Max: 36.7 (17 @ 17:38)  HR: 68 (08-15-17 @ 05:06) (67 - 74)  BP: 110/67 (08-15-17 @ 05:06) (102/71 - 122/78)  RR: 17 (08-15-17 @ 05:06) (17 - 18)  SpO2: 100% (08-15-17 @ 05:06) (98% - 100%)  Wt(kg): --  Daily     Daily Weight in k.5 (15 Aug 2017 05:06)      Physical Exam:  Gen: NAD  HEENT: EOMI  CV: RRR, normal S1 + S2, no m/r/g  Lungs: CTAB  Abd: soft, non-tender  Ext: No edema    Telemetry: A-fib 80s; Occasional PVDs    Laboratory Data:                        8.6    6.03  )-----------( 137      ( 15 Aug 2017 07:00 )             28.6     08-15    135  |  95<L>  |  45<H>  ----------------------------<  115<H>  3.4<L>   |  21<L>  |  1.70<H>    Ca    9.3      15 Aug 2017 07:00  Mg     1.6     08-15    TPro  7.8  /  Alb  3.9  /  TBili  2.0<H>  /  DBili  0.8<H>  /  AST  34  /  ALT  19  /  AlkPhos  84  08-    PT/INR - ( 15 Aug 2017 07:00 )   PT: 18.3 SEC;   INR: 1.62            Inpatient Medications:  MEDICATIONS  (STANDING):  sodium chloride 0.9% lock flush 3 milliLiter(s) IV Push every 8 hours  furosemide   Injectable 20 milliGRAM(s) IV Push every 12 hours  aspirin  chewable 81 milliGRAM(s) Oral daily  docusate sodium 100 milliGRAM(s) Oral three times a day  insulin lispro (HumaLOG) corrective regimen sliding scale   SubCutaneous three times a day before meals  insulin lispro (HumaLOG) corrective regimen sliding scale   SubCutaneous at bedtime  dextrose 5%. 1000 milliLiter(s) (50 mL/Hr) IV Continuous <Continuous>  dextrose 50% Injectable 12.5 Gram(s) IV Push once  dextrose 50% Injectable 25 Gram(s) IV Push once  dextrose 50% Injectable 25 Gram(s) IV Push once      Assessment: 89 year old man with history of CHF, liver disease(cirrhotic?), admitted with decompensated CHF and atrial fibrillation.        1. Acute/Chronic CHF  Concern for severe LV dysfunction with right-sided failure  volume status mildly improved, but still grossly fluid overloaded on exam.  continue iv lasix as ordered  if no significantly negative, will increase to 40mg Iv  Awaiting echo       2. AFIB  Rate controlled without AV cristi blockade  cont to monitor on tele  f/u echo   hold a/c, pt with elevated inr sec to liver dysfxn  asa only     3. Liver Disease   Likely due to R-sided CHF with hepatic congestion.  GI evaluation pending.    4. CKD  NAWAF  cont to monitor.  If creatinine continues to worsen, may need inotropic assisted diuresis.  check u/a for protein    5. anemia   cont to monitor    6. Hypokalemia  In the setting of diuresis.  Replete K    Over 35 minutes spent on total encounter; more than 50% of the visit was spent counseling and/or coordinating care by the attending physician.      Damian Quan M.D.   Cardiovascular Disease  [phone] Cardiovascular Disease Progress Note  (For Dr. Li)    Overnight events: No acute events overnight. Mr. Owen denies chest pain. SOB mildly improved.   Otherwise review of systems negative    Objective Findings:  T(C): 36.3 (08-15-17 @ 05:06), Max: 36.7 (17 @ 17:38)  HR: 68 (08-15-17 @ 05:06) (67 - 74)  BP: 110/67 (08-15-17 @ 05:06) (102/71 - 122/78)  RR: 17 (08-15-17 @ 05:06) (17 - 18)  SpO2: 100% (08-15-17 @ 05:06) (98% - 100%)  Wt(kg): --  Daily     Daily Weight in k.5 (15 Aug 2017 05:06)      Physical Exam:  Gen: NAD  HEENT: EOMI  CV: IIR, normal S1 + S2, no m/r/g, +JVD  Lungs: CTAB/L  Abd: soft, non-tender, + ascites  Ext: No edema    Telemetry: A-fib 80s; Occasional PVDs    Laboratory Data:                        8.6    6.03  )-----------( 137      ( 15 Aug 2017 07:00 )             28.6     08-15    135  |  95<L>  |  45<H>  ----------------------------<  115<H>  3.4<L>   |  21<L>  |  1.70<H>    Ca    9.3      15 Aug 2017 07:00  Mg     1.6     08-15    TPro  7.8  /  Alb  3.9  /  TBili  2.0<H>  /  DBili  0.8<H>  /  AST  34  /  ALT  19  /  AlkPhos  84      PT/INR - ( 15 Aug 2017 07:00 )   PT: 18.3 SEC;   INR: 1.62            Inpatient Medications:  MEDICATIONS  (STANDING):  sodium chloride 0.9% lock flush 3 milliLiter(s) IV Push every 8 hours  furosemide   Injectable 20 milliGRAM(s) IV Push every 12 hours  aspirin  chewable 81 milliGRAM(s) Oral daily  docusate sodium 100 milliGRAM(s) Oral three times a day  insulin lispro (HumaLOG) corrective regimen sliding scale   SubCutaneous three times a day before meals  insulin lispro (HumaLOG) corrective regimen sliding scale   SubCutaneous at bedtime  dextrose 5%. 1000 milliLiter(s) (50 mL/Hr) IV Continuous <Continuous>  dextrose 50% Injectable 12.5 Gram(s) IV Push once  dextrose 50% Injectable 25 Gram(s) IV Push once  dextrose 50% Injectable 25 Gram(s) IV Push once      Assessment: 89 year old man with history of CHF, liver disease(cirrhotic?), admitted with decompensated CHF and atrial fibrillation.        1. Acute/Chronic CHF  Physical exam consistent with right-sided heart failure  volume status mildly improved, but still grossly fluid overloaded on exam.  Increase Lasix to 40mg IV BID for inadequate UOP  Awaiting echo       2. AFIB  Rate controlled without AV cristi blockade  cont to monitor on tele  f/u echo   hold a/c, pt with elevated inr sec to liver dysfxn  asa only     3. Liver Disease   Likely due to R-sided CHF with hepatic congestion.  GI evaluation pending.    4. CKD  NAWAF  cont to monitor.  If creatinine continues to worsen, may need inotropic assisted diuresis.  check u/a for protein    5. anemia   cont to monitor    6. Hypokalemia  In the setting of diuresis.  Replete K    Over 35 minutes spent on total encounter; more than 50% of the visit was spent counseling and/or coordinating care by the attending physician.      Damian Quan M.D.   (For Dr. Li)  Cardiovascular Disease  [phone]

## 2017-08-16 LAB
BUN SERPL-MCNC: 52 MG/DL — HIGH (ref 7–23)
CALCIUM SERPL-MCNC: 9 MG/DL — SIGNIFICANT CHANGE UP (ref 8.4–10.5)
CHLORIDE SERPL-SCNC: 98 MMOL/L — SIGNIFICANT CHANGE UP (ref 98–107)
CO2 SERPL-SCNC: 25 MMOL/L — SIGNIFICANT CHANGE UP (ref 22–31)
CREAT SERPL-MCNC: 1.79 MG/DL — HIGH (ref 0.5–1.3)
GLUCOSE SERPL-MCNC: 123 MG/DL — HIGH (ref 70–99)
HCT VFR BLD CALC: 26.4 % — LOW (ref 39–50)
HGB BLD-MCNC: 8.2 G/DL — LOW (ref 13–17)
INR BLD: 1.63 — HIGH (ref 0.88–1.17)
MAGNESIUM SERPL-MCNC: 1.6 MG/DL — SIGNIFICANT CHANGE UP (ref 1.6–2.6)
MCHC RBC-ENTMCNC: 24.6 PG — LOW (ref 27–34)
MCHC RBC-ENTMCNC: 31.1 % — LOW (ref 32–36)
MCV RBC AUTO: 79 FL — LOW (ref 80–100)
NRBC # FLD: 0 — SIGNIFICANT CHANGE UP
PLATELET # BLD AUTO: 131 K/UL — LOW (ref 150–400)
PMV BLD: 11.6 FL — SIGNIFICANT CHANGE UP (ref 7–13)
POTASSIUM SERPL-MCNC: 3.3 MMOL/L — LOW (ref 3.5–5.3)
POTASSIUM SERPL-SCNC: 3.3 MMOL/L — LOW (ref 3.5–5.3)
PROTHROM AB SERPL-ACNC: 18.4 SEC — HIGH (ref 9.8–13.1)
RBC # BLD: 3.34 M/UL — LOW (ref 4.2–5.8)
RBC # FLD: 17.9 % — HIGH (ref 10.3–14.5)
SMOOTH MUSCLE AB SER-ACNC: SIGNIFICANT CHANGE UP
SODIUM SERPL-SCNC: 137 MMOL/L — SIGNIFICANT CHANGE UP (ref 135–145)
WBC # BLD: 5.77 K/UL — SIGNIFICANT CHANGE UP (ref 3.8–10.5)
WBC # FLD AUTO: 5.77 K/UL — SIGNIFICANT CHANGE UP (ref 3.8–10.5)

## 2017-08-16 PROCEDURE — 99232 SBSQ HOSP IP/OBS MODERATE 35: CPT | Mod: GC

## 2017-08-16 PROCEDURE — 93306 TTE W/DOPPLER COMPLETE: CPT | Mod: 26

## 2017-08-16 RX ORDER — POTASSIUM CHLORIDE 20 MEQ
40 PACKET (EA) ORAL EVERY 4 HOURS
Qty: 0 | Refills: 0 | Status: COMPLETED | OUTPATIENT
Start: 2017-08-16 | End: 2017-08-16

## 2017-08-16 RX ORDER — MAGNESIUM OXIDE 400 MG ORAL TABLET 241.3 MG
400 TABLET ORAL
Qty: 0 | Refills: 0 | Status: COMPLETED | OUTPATIENT
Start: 2017-08-16 | End: 2017-08-18

## 2017-08-16 RX ADMIN — Medication 40 MILLIGRAM(S): at 05:41

## 2017-08-16 RX ADMIN — Medication 100 MILLIGRAM(S): at 05:41

## 2017-08-16 RX ADMIN — MAGNESIUM OXIDE 400 MG ORAL TABLET 400 MILLIGRAM(S): 241.3 TABLET ORAL at 12:17

## 2017-08-16 RX ADMIN — Medication 1: at 17:50

## 2017-08-16 RX ADMIN — Medication 1: at 12:17

## 2017-08-16 RX ADMIN — SODIUM CHLORIDE 3 MILLILITER(S): 9 INJECTION INTRAMUSCULAR; INTRAVENOUS; SUBCUTANEOUS at 05:42

## 2017-08-16 RX ADMIN — Medication 100 MILLIGRAM(S): at 21:28

## 2017-08-16 RX ADMIN — Medication 81 MILLIGRAM(S): at 12:21

## 2017-08-16 RX ADMIN — SODIUM CHLORIDE 3 MILLILITER(S): 9 INJECTION INTRAMUSCULAR; INTRAVENOUS; SUBCUTANEOUS at 21:28

## 2017-08-16 RX ADMIN — Medication 40 MILLIEQUIVALENT(S): at 17:34

## 2017-08-16 RX ADMIN — Medication 40 MILLIEQUIVALENT(S): at 21:28

## 2017-08-16 RX ADMIN — SODIUM CHLORIDE 3 MILLILITER(S): 9 INJECTION INTRAMUSCULAR; INTRAVENOUS; SUBCUTANEOUS at 14:42

## 2017-08-16 RX ADMIN — Medication 40 MILLIEQUIVALENT(S): at 12:17

## 2017-08-16 RX ADMIN — Medication 40 MILLIGRAM(S): at 17:35

## 2017-08-16 RX ADMIN — MAGNESIUM OXIDE 400 MG ORAL TABLET 400 MILLIGRAM(S): 241.3 TABLET ORAL at 17:34

## 2017-08-16 NOTE — PROGRESS NOTE ADULT - SUBJECTIVE AND OBJECTIVE BOX
Chief Complaint:  Patient is a 89y old  Male who presents with a chief complaint of SOB X 1 monthe (13 Aug 2017 02:07)      Interval Events: No events overnight. No BM in last 4 days but SOB improved. Echo done pending results.    Allergies:  No Known Allergies      Hospital Medications:  sodium chloride 0.9% lock flush 3 milliLiter(s) IV Push every 8 hours  aspirin  chewable 81 milliGRAM(s) Oral daily  docusate sodium 100 milliGRAM(s) Oral three times a day  guaiFENesin    Syrup 100 milliGRAM(s) Oral every 6 hours PRN  insulin lispro (HumaLOG) corrective regimen sliding scale   SubCutaneous three times a day before meals  insulin lispro (HumaLOG) corrective regimen sliding scale   SubCutaneous at bedtime  dextrose 5%. 1000 milliLiter(s) IV Continuous <Continuous>  dextrose Gel 1 Dose(s) Oral once PRN  dextrose 50% Injectable 12.5 Gram(s) IV Push once  dextrose 50% Injectable 25 Gram(s) IV Push once  dextrose 50% Injectable 25 Gram(s) IV Push once  glucagon  Injectable 1 milliGRAM(s) IntraMuscular once PRN  bisacodyl 5 milliGRAM(s) Oral every 12 hours PRN  furosemide   Injectable 40 milliGRAM(s) IV Push every 12 hours  polyethylene glycol 3350 17 Gram(s) Oral daily      PMHX/PSHX:  Liver disease  CHF (congestive heart failure)  No significant past surgical history      Family history:  No pertinent family history in first degree relatives      ROS:     General:  No fevers, chills, night sweats  ENT:  No dysphagia  CV:  No pain, palpitations, hypo/hypertension  Resp:  No dyspnea, cough, tachypnea, wheezing  GI:  See HPI  :  No pain, bleeding, incontinence, nocturia  Muscle:  No pain, weakness  Neuro:  No weakness, tingling, memory problems  Heme:  No petechiae, ecchymosis, easy bruisability  Skin:  No rash, B/L pedal edema      PHYSICAL EXAM:     GENERAL:  no distress  CHEST:  Full & symmetric excursion, no increased effort, breath sounds decreased at bases  HEART:  Regular rhythm, S1, S2, no added sounds  ABDOMEN:  Soft, non-tender, non-distended, normoactive bowel sounds,   EXTREMITIES:  no cyanosis, clubbing B/L edema  SKIN:  No rash/erythema/ecchymoses/petechiae/wounds/abscess/warm/dry  NEURO:  Alert, oriented, non focal    Vital Signs:  Vital Signs Last 24 Hrs  T(C): 36.8 (16 Aug 2017 05:39), Max: 36.9 (15 Aug 2017 13:51)  T(F): 98.2 (16 Aug 2017 05:39), Max: 98.4 (15 Aug 2017 13:51)  HR: 67 (16 Aug 2017 05:39) (67 - 93)  BP: 111/74 (16 Aug 2017 05:39) (111/74 - 119/78)  BP(mean): --  RR: 18 (16 Aug 2017 05:39) (18 - 18)  SpO2: 98% (16 Aug 2017 05:39) (98% - 100%)  Daily     Daily Weight in k (16 Aug 2017 05:39)    LABS:                        8.2    5.77  )-----------( 131      ( 16 Aug 2017 04:00 )             26.4     08-16    137  |  98  |  52<H>  ----------------------------<  123<H>  3.3<L>   |  25  |  1.79<H>    Ca    9.0      16 Aug 2017 04:00  Mg     1.6     08-16        PT/INR - ( 16 Aug 2017 04:00 )   PT: 18.4 SEC;   INR: 1.63            Urinalysis Basic - ( 15 Aug 2017 16:00 )    Color: PLYEL / Appearance: CLEAR / S.008 / pH: 6.0  Gluc: NEGATIVE / Ketone: NEGATIVE  / Bili: NEGATIVE / Urobili: NORMAL E.U.   Blood: NEGATIVE / Protein: NEGATIVE / Nitrite: NEGATIVE   Leuk Esterase: NEGATIVE / RBC: 0-2 / WBC x   Sq Epi: x / Non Sq Epi: x / Bacteria: x Chief Complaint:  Patient is an 89y old  Male who presents with a chief complaint of SOB X 1 monthe (13 Aug 2017 02:07)      Interval Events: No events overnight. No BM in last 4 days but SOB improved. Echo done pending results.    Allergies:  No Known Allergies      Hospital Medications:  sodium chloride 0.9% lock flush 3 milliLiter(s) IV Push every 8 hours  aspirin  chewable 81 milliGRAM(s) Oral daily  docusate sodium 100 milliGRAM(s) Oral three times a day  guaiFENesin    Syrup 100 milliGRAM(s) Oral every 6 hours PRN  insulin lispro (HumaLOG) corrective regimen sliding scale   SubCutaneous three times a day before meals  insulin lispro (HumaLOG) corrective regimen sliding scale   SubCutaneous at bedtime  dextrose 5%. 1000 milliLiter(s) IV Continuous <Continuous>  dextrose Gel 1 Dose(s) Oral once PRN  dextrose 50% Injectable 12.5 Gram(s) IV Push once  dextrose 50% Injectable 25 Gram(s) IV Push once  dextrose 50% Injectable 25 Gram(s) IV Push once  glucagon  Injectable 1 milliGRAM(s) IntraMuscular once PRN  bisacodyl 5 milliGRAM(s) Oral every 12 hours PRN  furosemide   Injectable 40 milliGRAM(s) IV Push every 12 hours  polyethylene glycol 3350 17 Gram(s) Oral daily      PMHX/PSHX:  Liver disease  CHF (congestive heart failure)  No significant past surgical history      Family history:  No pertinent family history in first degree relatives      ROS:     General:  No fevers, chills, night sweats  ENT:  No dysphagia  CV:  No pain, palpitations, hypo/hypertension  Resp:  No dyspnea, cough, tachypnea, wheezing  GI:  See HPI  :  No pain, bleeding, incontinence, nocturia  Muscle:  No pain, weakness  Neuro:  No weakness, tingling, memory problems  Heme:  No petechiae, ecchymosis, easy bruisability  Skin:  No rash, B/L pedal edema      PHYSICAL EXAM:     GENERAL:  no distress  CHEST:  Full & symmetric excursion, no increased effort, breath sounds decreased at bases  HEART:  Regular rhythm, S1, S2, no added sounds  ABDOMEN:  Soft, non-tender, non-distended, normoactive bowel sounds,   EXTREMITIES:  no cyanosis, clubbing B/L edema  SKIN:  No rash/erythema/ecchymoses/petechiae/wounds/abscess/warm/dry  NEURO:  Alert, oriented, non focal    Vital Signs:  Vital Signs Last 24 Hrs  T(C): 36.8 (16 Aug 2017 05:39), Max: 36.9 (15 Aug 2017 13:51)  T(F): 98.2 (16 Aug 2017 05:39), Max: 98.4 (15 Aug 2017 13:51)  HR: 67 (16 Aug 2017 05:39) (67 - 93)  BP: 111/74 (16 Aug 2017 05:39) (111/74 - 119/78)  BP(mean): --  RR: 18 (16 Aug 2017 05:39) (18 - 18)  SpO2: 98% (16 Aug 2017 05:39) (98% - 100%)  Daily     Daily Weight in k (16 Aug 2017 05:39)    LABS:                        8.2    5.77  )-----------( 131      ( 16 Aug 2017 04:00 )             26.4     08-16    137  |  98  |  52<H>  ----------------------------<  123<H>  3.3<L>   |  25  |  1.79<H>    Ca    9.0      16 Aug 2017 04:00  Mg     1.6     08-16        PT/INR - ( 16 Aug 2017 04:00 )   PT: 18.4 SEC;   INR: 1.63            Urinalysis Basic - ( 15 Aug 2017 16:00 )    Color: PLYEL / Appearance: CLEAR / S.008 / pH: 6.0  Gluc: NEGATIVE / Ketone: NEGATIVE  / Bili: NEGATIVE / Urobili: NORMAL E.U.   Blood: NEGATIVE / Protein: NEGATIVE / Nitrite: NEGATIVE   Leuk Esterase: NEGATIVE / RBC: 0-2 / WBC x   Sq Epi: x / Non Sq Epi: x / Bacteria: x

## 2017-08-16 NOTE — PROGRESS NOTE ADULT - ASSESSMENT
· Assessment		   89 yo M with PMH of CHF, Chronic Anemia, ? Cryptogenic Cirrhosis Possible cardiac etiology ( Cardiac Cirrhosis vs Congestive Hepatopathy ) admitted for CHF and new onset A Fib being followed by Hepatology for Cirrhosis and Volume overload.  1 :  Cirrhosis : likely due to CHF, Volume status and symptoms improving on Diuretics, Low salt Diet, on lasix        Viral serologies negative. U/S revealed cirrhosis, dilated IVC and normal size spleen. Albumin also normal argues against Cirrhosis        Beta blocker held due to episodes of bradycardia.  2 ': Constipation ; - on Colace, Dulcolax and Miralax.                             - No BM yet, on daily doses of Miralax.  3 : Ascites : Minimal ascites on physical exam and on U/S.                     - May not be enough to tap.  4 : Chronic Anemia : needs iron studies, Vitamin B12 level, Folate.     - EGD and Colonoscopy depends upon patient cardiac status , pending echo results.  4 : A Fib & CHF : plan as per cardiology, pending echo results\

## 2017-08-16 NOTE — PROGRESS NOTE ADULT - SUBJECTIVE AND OBJECTIVE BOX
Cardiovascular Disease Progress Note  (For Dr. Li)    Overnight events: No acute events overnight. Mr. Owen denies chest pain or SOB.   Otherwise review of systems negative  Objective Findings:  T(C): 36.8 (17 @ 05:39), Max: 36.9 (08-15-17 @ 13:51)  HR: 67 (17 @ 05:39) (67 - 93)  BP: 111/74 (17 @ 05:39) (111/74 - 119/78)  RR: 18 (17 @ 05:39) (18 - 18)  SpO2: 98% (17 @ 05:39) (98% - 100%)  Wt(kg): --  Daily     Daily Weight in k (16 Aug 2017 05:39)      Physical Exam:  Gen: NAD  HEENT: EOMI  CV: RRR, normal S1 + S2, no m/r/g  Lungs: Decreased breath sounds b/l  Abd: soft, non-tender  Ext: 2+ pedal edema    Telemetry: A-fib 60-90s; Occasional PVDs    Laboratory Data:                        8.2    5.77  )-----------( 131      ( 16 Aug 2017 04:00 )             26.4         137  |  98  |  52<H>  ----------------------------<  123<H>  3.3<L>   |  25  |  1.79<H>    Ca    9.0      16 Aug 2017 04:00  Mg     1.6           PT/INR - ( 16 Aug 2017 04:00 )   PT: 18.4 SEC;   INR: 1.63                    Inpatient Medications:  MEDICATIONS  (STANDING):  sodium chloride 0.9% lock flush 3 milliLiter(s) IV Push every 8 hours  aspirin  chewable 81 milliGRAM(s) Oral daily  docusate sodium 100 milliGRAM(s) Oral three times a day  insulin lispro (HumaLOG) corrective regimen sliding scale   SubCutaneous three times a day before meals  insulin lispro (HumaLOG) corrective regimen sliding scale   SubCutaneous at bedtime  dextrose 5%. 1000 milliLiter(s) (50 mL/Hr) IV Continuous <Continuous>  dextrose 50% Injectable 12.5 Gram(s) IV Push once  dextrose 50% Injectable 25 Gram(s) IV Push once  dextrose 50% Injectable 25 Gram(s) IV Push once  furosemide   Injectable 40 milliGRAM(s) IV Push every 12 hours  polyethylene glycol 3350 17 Gram(s) Oral daily  potassium chloride    Tablet ER 40 milliEquivalent(s) Oral every 4 hours  magnesium oxide 400 milliGRAM(s) Oral three times a day with meals      Assessment: 89 year old man with history of CHF, liver disease(cirrhotic?), admitted with decompensated CHF and atrial fibrillation.        1. Acute/Chronic CHF  Physical exam consistent with right-sided heart failure  Volume status mildly improved, but still grossly fluid overloaded on exam.  Lasix increased to 40mg IV BID yesterday for inadequate UOP  Awaiting echo       2. AFIB  Rate controlled without AV cristi blockade  cont to monitor on tele  f/u echo   hold a/c, pt with elevated inr sec to liver dysfxn  asa only     3. Liver Disease   Likely due to R-sided CHF with hepatic congestion.  GI evaluation noted- patient will need TTE and volume optimization prior to any further procedures.    4. NAWAF  cont to monitor.  If creatinine continues to worsen, may need inotropic assisted diuresis.  check u/a for protein    5. anemia   cont to monitor    6. Hypokalemia  In the setting of diuresis.  Replete K      Over 35 minutes spent on total encounter; more than 50% of the visit was spent counseling and/or coordinating care by the attending physician.      Damian Quan M.D.   Cardiovascular Disease  [phone] Cardiovascular Disease Progress Note  (For Dr. Li)    Overnight events: No acute events overnight. Mr. Owen is sitting in the chair. He denies chest pain or SOB.   Otherwise review of systems negative    Objective Findings:  T(C): 36.8 (17 @ 05:39), Max: 36.9 (08-15-17 @ 13:51)  HR: 67 (17 @ 05:39) (67 - 93)  BP: 111/74 (17 @ 05:39) (111/74 - 119/78)  RR: 18 (17 @ 05:39) (18 - 18)  SpO2: 98% (17 @ 05:39) (98% - 100%)  Wt(kg): --  Daily     Daily Weight in k (16 Aug 2017 05:39)      Physical Exam:  Gen: NAD  HEENT: EOMI  CV: RRR, normal S1 + S2, no m/r/g  Lungs: Decreased breath sounds b/l  Abd: soft, non-tender, + ascites  Ext: No pedal edema    Telemetry: A-fib 60-90s; Occasional PVDs    Laboratory Data:                        8.2    5.77  )-----------( 131      ( 16 Aug 2017 04:00 )             26.4         137  |  98  |  52<H>  ----------------------------<  123<H>  3.3<L>   |  25  |  1.79<H>    Ca    9.0      16 Aug 2017 04:00  Mg     1.6           PT/INR - ( 16 Aug 2017 04:00 )   PT: 18.4 SEC;   INR: 1.63                    Inpatient Medications:  MEDICATIONS  (STANDING):  sodium chloride 0.9% lock flush 3 milliLiter(s) IV Push every 8 hours  aspirin  chewable 81 milliGRAM(s) Oral daily  docusate sodium 100 milliGRAM(s) Oral three times a day  insulin lispro (HumaLOG) corrective regimen sliding scale   SubCutaneous three times a day before meals  insulin lispro (HumaLOG) corrective regimen sliding scale   SubCutaneous at bedtime  dextrose 5%. 1000 milliLiter(s) (50 mL/Hr) IV Continuous <Continuous>  dextrose 50% Injectable 12.5 Gram(s) IV Push once  dextrose 50% Injectable 25 Gram(s) IV Push once  dextrose 50% Injectable 25 Gram(s) IV Push once  furosemide   Injectable 40 milliGRAM(s) IV Push every 12 hours  polyethylene glycol 3350 17 Gram(s) Oral daily  potassium chloride    Tablet ER 40 milliEquivalent(s) Oral every 4 hours  magnesium oxide 400 milliGRAM(s) Oral three times a day with meals      Assessment: 89 year old man with history of CHF, liver disease(cirrhotic?), admitted with decompensated CHF and atrial fibrillation.        1. Acute/Chronic CHF  Physical exam consistent with right-sided heart failure  Volume status mildly improved, but still grossly fluid overloaded on exam.  Lasix increased to 40mg IV BID yesterday for inadequate UOP  Awaiting echo       2. AFIB  Rate controlled without AV cristi blockade  cont to monitor on tele  f/u echo   hold a/c, pt with elevated inr sec to liver dysfxn  asa only     3. Liver Disease   Likely due to R-sided CHF with hepatic congestion.  GI evaluation noted- patient will need TTE and volume optimization prior to any invasive GI procedures.    4. NAWAF  cont to monitor.  If creatinine continues to worsen, may need inotropic assisted diuresis.  check u/a for protein    5. Hypokalemia  In the setting of diuresis.  Replete K      Over 35 minutes spent on total encounter; more than 50% of the visit was spent counseling and/or coordinating care by the attending physician.      Damian Quan M.D.   Cardiovascular Disease  [phone]

## 2017-08-17 LAB
BUN SERPL-MCNC: 53 MG/DL — HIGH (ref 7–23)
CALCIUM SERPL-MCNC: 9.7 MG/DL — SIGNIFICANT CHANGE UP (ref 8.4–10.5)
CHLORIDE SERPL-SCNC: 98 MMOL/L — SIGNIFICANT CHANGE UP (ref 98–107)
CO2 SERPL-SCNC: 26 MMOL/L — SIGNIFICANT CHANGE UP (ref 22–31)
CREAT SERPL-MCNC: 1.73 MG/DL — HIGH (ref 0.5–1.3)
GLUCOSE SERPL-MCNC: 127 MG/DL — HIGH (ref 70–99)
HCT VFR BLD CALC: 29.3 % — LOW (ref 39–50)
HGB BLD-MCNC: 8.8 G/DL — LOW (ref 13–17)
INR BLD: 1.57 — HIGH (ref 0.88–1.17)
MAGNESIUM SERPL-MCNC: 1.9 MG/DL — SIGNIFICANT CHANGE UP (ref 1.6–2.6)
MCHC RBC-ENTMCNC: 24.2 PG — LOW (ref 27–34)
MCHC RBC-ENTMCNC: 30 % — LOW (ref 32–36)
MCV RBC AUTO: 80.7 FL — SIGNIFICANT CHANGE UP (ref 80–100)
NRBC # FLD: 0 — SIGNIFICANT CHANGE UP
PHOSPHATE SERPL-MCNC: 3.4 MG/DL — SIGNIFICANT CHANGE UP (ref 2.5–4.5)
PLATELET # BLD AUTO: 129 K/UL — LOW (ref 150–400)
PMV BLD: 11.2 FL — SIGNIFICANT CHANGE UP (ref 7–13)
POTASSIUM SERPL-MCNC: 4.2 MMOL/L — SIGNIFICANT CHANGE UP (ref 3.5–5.3)
POTASSIUM SERPL-SCNC: 4.2 MMOL/L — SIGNIFICANT CHANGE UP (ref 3.5–5.3)
PROTHROM AB SERPL-ACNC: 17.7 SEC — HIGH (ref 9.8–13.1)
RBC # BLD: 3.63 M/UL — LOW (ref 4.2–5.8)
RBC # FLD: 18.1 % — HIGH (ref 10.3–14.5)
SODIUM SERPL-SCNC: 138 MMOL/L — SIGNIFICANT CHANGE UP (ref 135–145)
WBC # BLD: 5.71 K/UL — SIGNIFICANT CHANGE UP (ref 3.8–10.5)
WBC # FLD AUTO: 5.71 K/UL — SIGNIFICANT CHANGE UP (ref 3.8–10.5)

## 2017-08-17 PROCEDURE — 99232 SBSQ HOSP IP/OBS MODERATE 35: CPT | Mod: GC

## 2017-08-17 RX ADMIN — SODIUM CHLORIDE 3 MILLILITER(S): 9 INJECTION INTRAMUSCULAR; INTRAVENOUS; SUBCUTANEOUS at 22:10

## 2017-08-17 RX ADMIN — Medication 40 MILLIGRAM(S): at 17:00

## 2017-08-17 RX ADMIN — Medication: at 18:35

## 2017-08-17 RX ADMIN — Medication 100 MILLIGRAM(S): at 05:10

## 2017-08-17 RX ADMIN — Medication 81 MILLIGRAM(S): at 11:51

## 2017-08-17 RX ADMIN — POLYETHYLENE GLYCOL 3350 17 GRAM(S): 17 POWDER, FOR SOLUTION ORAL at 11:51

## 2017-08-17 RX ADMIN — MAGNESIUM OXIDE 400 MG ORAL TABLET 400 MILLIGRAM(S): 241.3 TABLET ORAL at 11:51

## 2017-08-17 RX ADMIN — Medication 100 MILLIGRAM(S): at 13:00

## 2017-08-17 RX ADMIN — SODIUM CHLORIDE 3 MILLILITER(S): 9 INJECTION INTRAMUSCULAR; INTRAVENOUS; SUBCUTANEOUS at 05:10

## 2017-08-17 RX ADMIN — MAGNESIUM OXIDE 400 MG ORAL TABLET 400 MILLIGRAM(S): 241.3 TABLET ORAL at 17:00

## 2017-08-17 RX ADMIN — Medication 100 MILLIGRAM(S): at 22:11

## 2017-08-17 RX ADMIN — Medication 1: at 11:51

## 2017-08-17 RX ADMIN — Medication 40 MILLIGRAM(S): at 05:10

## 2017-08-17 RX ADMIN — MAGNESIUM OXIDE 400 MG ORAL TABLET 400 MILLIGRAM(S): 241.3 TABLET ORAL at 09:27

## 2017-08-17 RX ADMIN — SODIUM CHLORIDE 3 MILLILITER(S): 9 INJECTION INTRAMUSCULAR; INTRAVENOUS; SUBCUTANEOUS at 13:00

## 2017-08-17 NOTE — PROGRESS NOTE ADULT - SUBJECTIVE AND OBJECTIVE BOX
Chief Complaint:  Patient is a 89y old  Male who presents with a chief complaint of SOB X 1 monthe (13 Aug 2017 02:07)      Interval Events: No events overnight. Dyspnea improved, ECHO done.    Allergies:  No Known Allergies      Hospital Medications:  sodium chloride 0.9% lock flush 3 milliLiter(s) IV Push every 8 hours  aspirin  chewable 81 milliGRAM(s) Oral daily  docusate sodium 100 milliGRAM(s) Oral three times a day  guaiFENesin    Syrup 100 milliGRAM(s) Oral every 6 hours PRN  insulin lispro (HumaLOG) corrective regimen sliding scale   SubCutaneous three times a day before meals  insulin lispro (HumaLOG) corrective regimen sliding scale   SubCutaneous at bedtime  dextrose 5%. 1000 milliLiter(s) IV Continuous <Continuous>  dextrose Gel 1 Dose(s) Oral once PRN  dextrose 50% Injectable 12.5 Gram(s) IV Push once  dextrose 50% Injectable 25 Gram(s) IV Push once  dextrose 50% Injectable 25 Gram(s) IV Push once  glucagon  Injectable 1 milliGRAM(s) IntraMuscular once PRN  bisacodyl 5 milliGRAM(s) Oral every 12 hours PRN  furosemide   Injectable 40 milliGRAM(s) IV Push every 12 hours  polyethylene glycol 3350 17 Gram(s) Oral daily  magnesium oxide 400 milliGRAM(s) Oral three times a day with meals      PMHX/PSHX:  Liver disease  CHF (congestive heart failure)  No significant past surgical history      Family history:  No pertinent family history in first degree relatives      ROS:     General:  No  fevers, chills, night sweats  ENT:  No sore throat, pain, runny nose, dysphagia  CV:  No pain, palpitations, hypo/hypertension  Resp:  No cough, tachypnea, wheezing  GI:  See HPI  :  No pain, bleeding, incontinence, nocturia  Muscle:  No pain, weakness  Neuro:  No weakness, tingling, memory problems  Heme:  No petechiae, ecchymosis, easy bruisability  Skin:  No rash, B/L LE Edema      PHYSICAL EXAM:     GENERAL: no distress  CHEST:  Full & symmetric excursion, no increased effort, breath sounds clear but decreased at both bases  HEART:  Regular rhythm, S1, S2, no added sounds  ABDOMEN:  Soft, non-tender, non-distended, normoactive bowel sounds,    EXTREMITIES:  no cyanosis, clubbing  B/L pedal edema  SKIN:  No rash/erythema/ecchymoses/petechiae/wounds/abscess/warm/dry  NEURO:  Alert, oriented, non focal    Vital Signs:  Vital Signs Last 24 Hrs  T(C): 36.7 (17 Aug 2017 05:11), Max: 36.7 (17 Aug 2017 05:11)  T(F): 98 (17 Aug 2017 05:11), Max: 98 (17 Aug 2017 05:11)  HR: 79 (17 Aug 2017 05:11) (75 - 79)  BP: 104/76 (17 Aug 2017 05:11) (104/76 - 113/63)  BP(mean): --  RR: 16 (17 Aug 2017 05:11) (16 - 18)  SpO2: 100% (17 Aug 2017 05:11) (100% - 100%)  Daily     Daily Weight in k.1 (17 Aug 2017 05:11)    LABS:                        8.8    5.71  )-----------( 129      ( 17 Aug 2017 05:28 )             29.3     08-17    138  |  98  |  53<H>  ----------------------------<  127<H>  4.2   |  26  |  1.73<H>    Ca    9.7      17 Aug 2017 05:28  Phos  3.4     08-17  Mg     1.9     08-17        PT/INR - ( 17 Aug 2017 05:28 )   PT: 17.7 SEC;   INR: 1.57            Urinalysis Basic - ( 15 Aug 2017 16:00 )    Color: PLYEL / Appearance: CLEAR / S.008 / pH: 6.0  Gluc: NEGATIVE / Ketone: NEGATIVE  / Bili: NEGATIVE / Urobili: NORMAL E.U.   Blood: NEGATIVE / Protein: NEGATIVE / Nitrite: NEGATIVE   Leuk Esterase: NEGATIVE / RBC: 0-2 / WBC x   Sq Epi: x / Non Sq Epi: x / Bacteria: x          Imaging: Echocardiogram :  - Moderate MR, dilated LA.  - Severe TR   - Normal LV systolic function.

## 2017-08-17 NOTE — PROGRESS NOTE ADULT - SUBJECTIVE AND OBJECTIVE BOX
CARDIOLOGY FOLLOW UP NOTE - DR. ROBLEDO    Subjective:  no cp  less sob        PHYSICAL EXAM:  T(C): 36.7 (08-17-17 @ 05:11), Max: 36.7 (08-17-17 @ 05:11)  HR: 79 (08-17-17 @ 05:11) (75 - 79)  BP: 104/76 (08-17-17 @ 05:11) (104/76 - 113/63)  RR: 16 (08-17-17 @ 05:11) (16 - 18)  SpO2: 100% (08-17-17 @ 05:11) (100% - 100%)  Wt(kg): --  I&O's Summary    16 Aug 2017 07:01  -  17 Aug 2017 07:00  --------------------------------------------------------  IN: 400 mL / OUT: 1600 mL / NET: -1200 mL    17 Aug 2017 07:01  -  17 Aug 2017 11:44  --------------------------------------------------------  IN: 120 mL / OUT: 200 mL / NET: -80 mL        Appearance: Normal	  Cardiovascular: Normal S1 S2,RRR, No JVD, No murmurs  Respiratory: Lungs clear to auscultation	  Gastrointestinal:  Soft, Non-tender, + BS	  Extremities: Normal range of motion, No clubbing, less edema      MEDICATIONS  (STANDING):  sodium chloride 0.9% lock flush 3 milliLiter(s) IV Push every 8 hours  aspirin  chewable 81 milliGRAM(s) Oral daily  docusate sodium 100 milliGRAM(s) Oral three times a day  insulin lispro (HumaLOG) corrective regimen sliding scale   SubCutaneous three times a day before meals  insulin lispro (HumaLOG) corrective regimen sliding scale   SubCutaneous at bedtime  dextrose 5%. 1000 milliLiter(s) (50 mL/Hr) IV Continuous <Continuous>  dextrose 50% Injectable 12.5 Gram(s) IV Push once  dextrose 50% Injectable 25 Gram(s) IV Push once  dextrose 50% Injectable 25 Gram(s) IV Push once  furosemide   Injectable 40 milliGRAM(s) IV Push every 12 hours  polyethylene glycol 3350 17 Gram(s) Oral daily  magnesium oxide 400 milliGRAM(s) Oral three times a day with meals      TELEMETRY: 	    ECG:  	  RADIOLOGY:   DIAGNOSTIC TESTING:  [ ] Echocardiogram:  [ ] Catheterization:  [ ] Stress Test:    OTHER: 	    LABS:	 	    CARDIAC MARKERS:                                8.8    5.71  )-----------( 129      ( 17 Aug 2017 05:28 )             29.3     08-17    138  |  98  |  53<H>  ----------------------------<  127<H>  4.2   |  26  |  1.73<H>    Ca    9.7      17 Aug 2017 05:28  Phos  3.4     08-17  Mg     1.9     08-17      proBNP:   PT/INR - ( 17 Aug 2017 05:28 )   PT: 17.7 SEC;   INR: 1.57            Lipid Profile:   HgA1c:

## 2017-08-17 NOTE — PROGRESS NOTE ADULT - ASSESSMENT
89 year old man with history of CHF, liver disease(cirrhotic?), admitted with decompensated CHF and atrial fibrillation.        1. Acute/Chronic Diastolic CHF, acute right heart failure  volume status improving   continue iv lasix as ordered  echo with normal lc fxn  right sided dysfxn, severe tr      2. AFIB  bradycardia resolved off bb  no tachycardia  hold bb for now  cont to monitor on tele  no a/c   asa     3 . Liver Disease   ascites  ? sec to RHF    gi f/u  outpt f/u          4. CKD  NAWAF  cont to monitor    5. anemia   cont to monitor    d/c planning with out pt diuretics      dvt ppx

## 2017-08-17 NOTE — PROGRESS NOTE ADULT - ASSESSMENT
88 yo M with PMH of CHF, Chronic Anemia, ? Cryptogenic Cirrhosis Possible cardiac etiology ( Cardiac Cirrhosis vs Congestive Hepatopathy ) admitted for CHF and new onset A Fib being followed by Hepatology for Cirrhosis and Volume overload.    1 :  Cirrhosis : likely due to Valvular CHF, Volume status and symptoms improving on Diuretics, Low salt Diet, on Po Lasix        Viral serologies negative. U/S revealed cirrhosis, dilated IVC and normal size spleen. Albumin also normal argues against Cirrhosis        Beta blocker held due to episodes of bradycardia.    2 : Ascites : Minimal ascites on physical exam and on U/S.                     - May not be enough to tap, likley from CHF    3 : Chronic Anemia : needs iron studies, Vitamin B12 level, Folate.     - Possible EGD and Colonoscopy as outpatient after cardiac risk stratification.    4 : A Fib & Valvular CHF : plan as per cardiology,      5 : No further work up required from Hepatology stand point. 88 yo M with PMH of CHF, Chronic Anemia, ? Cryptogenic Cirrhosis Possible cardiac etiology ( Cardiac Cirrhosis vs Congestive Hepatopathy ) admitted for CHF and new onset A Fib being followed by Hepatology for Cirrhosis and Volume overload.    1 :  Cirrhosis : likely due to Valvular CHF, Volume status and symptoms improving on Diuretics, Low salt Diet, on Po Lasix        Viral serologies negative. DANA positive 1:320 , needs SPEP for IgG levels.        U/S revealed cirrhosis, dilated IVC and normal size spleen. Albumin also normal argues against Cirrhosis        Beta blocker held due to episodes of bradycardia.    2 : Ascites : Minimal ascites on physical exam and on U/S.                     - May not be enough to tap, likley from CHF    3 : Chronic Anemia : needs iron studies, Vitamin B12 level, Folate.     - Possible EGD and Colonoscopy as outpatient after cardiac risk stratification.    4 : A Fib & Valvular CHF : plan as per cardiology,      5 : Will need Liver clinic follow up as outpatient ( [phone] ) 88 yo M with PMH of CHF, Chronic Anemia, ? Cryptogenic Cirrhosis Possible cardiac etiology ( Cardiac Cirrhosis vs Congestive Hepatopathy ) admitted for CHF and new onset A Fib being followed by Hepatology for Cirrhosis and Volume overload.    1 :  Cirrhosis : likely due to Valvular CHF, Volume status and symptoms improving on Diuretics, Low salt Diet, on Po Lasix        Viral serologies negative. DANA positive 1:320 , needs SPEP for IgG levels.        U/S revealed cirrhosis, dilated IVC and normal size spleen. Albumin also normal argues against decompensated or Cirrhosis        Beta blocker held due to episodes of bradycardia.    2 : Ascites : Minimal ascites on physical exam and on U/S.                     - May not be enough to tap, likley from CHF    3 : Chronic Anemia : needs iron studies, Vitamin B12 level, Folate.     - Possible EGD and Colonoscopy as outpatient after cardiac risk stratification.    4 : A Fib & Valvular CHF : plan as per cardiology,      5 : Will need Liver clinic follow up as outpatient ( [phone] )

## 2017-08-18 VITALS — WEIGHT: 121.92 LBS

## 2017-08-18 LAB
BUN SERPL-MCNC: 45 MG/DL — HIGH (ref 7–23)
CALCIUM SERPL-MCNC: 9.6 MG/DL — SIGNIFICANT CHANGE UP (ref 8.4–10.5)
CHLORIDE SERPL-SCNC: 95 MMOL/L — LOW (ref 98–107)
CO2 SERPL-SCNC: 27 MMOL/L — SIGNIFICANT CHANGE UP (ref 22–31)
CREAT SERPL-MCNC: 1.46 MG/DL — HIGH (ref 0.5–1.3)
GLUCOSE SERPL-MCNC: 121 MG/DL — HIGH (ref 70–99)
HCT VFR BLD CALC: 29.9 % — LOW (ref 39–50)
HGB BLD-MCNC: 9 G/DL — LOW (ref 13–17)
INR BLD: 1.41 — HIGH (ref 0.88–1.17)
MAGNESIUM SERPL-MCNC: 2.2 MG/DL — SIGNIFICANT CHANGE UP (ref 1.6–2.6)
MCHC RBC-ENTMCNC: 24.4 PG — LOW (ref 27–34)
MCHC RBC-ENTMCNC: 30.1 % — LOW (ref 32–36)
MCV RBC AUTO: 81 FL — SIGNIFICANT CHANGE UP (ref 80–100)
NRBC # FLD: 0.02 — SIGNIFICANT CHANGE UP
PHOSPHATE SERPL-MCNC: 3.4 MG/DL — SIGNIFICANT CHANGE UP (ref 2.5–4.5)
PLATELET # BLD AUTO: 123 K/UL — LOW (ref 150–400)
PMV BLD: 10.8 FL — SIGNIFICANT CHANGE UP (ref 7–13)
POTASSIUM SERPL-MCNC: 3.9 MMOL/L — SIGNIFICANT CHANGE UP (ref 3.5–5.3)
POTASSIUM SERPL-SCNC: 3.9 MMOL/L — SIGNIFICANT CHANGE UP (ref 3.5–5.3)
PROTHROM AB SERPL-ACNC: 15.9 SEC — HIGH (ref 9.8–13.1)
RBC # BLD: 3.69 M/UL — LOW (ref 4.2–5.8)
RBC # FLD: 18.2 % — HIGH (ref 10.3–14.5)
SODIUM SERPL-SCNC: 138 MMOL/L — SIGNIFICANT CHANGE UP (ref 135–145)
WBC # BLD: 5.58 K/UL — SIGNIFICANT CHANGE UP (ref 3.8–10.5)
WBC # FLD AUTO: 5.58 K/UL — SIGNIFICANT CHANGE UP (ref 3.8–10.5)

## 2017-08-18 RX ORDER — POTASSIUM CHLORIDE 20 MEQ
1 PACKET (EA) ORAL
Qty: 30 | Refills: 0 | OUTPATIENT
Start: 2017-08-18 | End: 2017-09-17

## 2017-08-18 RX ORDER — FUROSEMIDE 40 MG
1 TABLET ORAL
Qty: 60 | Refills: 0 | OUTPATIENT
Start: 2017-08-18 | End: 2017-09-17

## 2017-08-18 RX ORDER — POLYETHYLENE GLYCOL 3350 17 G/17G
17 POWDER, FOR SOLUTION ORAL
Qty: 0 | Refills: 0 | COMMUNITY
Start: 2017-08-18

## 2017-08-18 RX ORDER — DOCUSATE SODIUM 100 MG
1 CAPSULE ORAL
Qty: 0 | Refills: 0 | COMMUNITY
Start: 2017-08-18

## 2017-08-18 RX ORDER — ASPIRIN/CALCIUM CARB/MAGNESIUM 324 MG
1 TABLET ORAL
Qty: 0 | Refills: 0 | COMMUNITY
Start: 2017-08-18

## 2017-08-18 RX ADMIN — MAGNESIUM OXIDE 400 MG ORAL TABLET 400 MILLIGRAM(S): 241.3 TABLET ORAL at 09:32

## 2017-08-18 RX ADMIN — Medication 100 MILLIGRAM(S): at 05:07

## 2017-08-18 RX ADMIN — SODIUM CHLORIDE 3 MILLILITER(S): 9 INJECTION INTRAMUSCULAR; INTRAVENOUS; SUBCUTANEOUS at 05:07

## 2017-08-18 RX ADMIN — Medication 81 MILLIGRAM(S): at 13:28

## 2017-08-18 RX ADMIN — SODIUM CHLORIDE 3 MILLILITER(S): 9 INJECTION INTRAMUSCULAR; INTRAVENOUS; SUBCUTANEOUS at 13:29

## 2017-08-18 RX ADMIN — Medication 40 MILLIGRAM(S): at 18:06

## 2017-08-18 RX ADMIN — POLYETHYLENE GLYCOL 3350 17 GRAM(S): 17 POWDER, FOR SOLUTION ORAL at 13:28

## 2017-08-18 RX ADMIN — Medication 2: at 13:24

## 2017-08-18 RX ADMIN — Medication 40 MILLIGRAM(S): at 05:07

## 2017-08-18 RX ADMIN — Medication 100 MILLIGRAM(S): at 13:28

## 2017-08-18 NOTE — DISCHARGE NOTE ADULT - PROVIDER TOKENS
FREE:[LAST:[[facility] Medicine and Cardiology clinic],PHONE:[phone]],FAX:[(   )    -]]

## 2017-08-18 NOTE — DISCHARGE NOTE ADULT - PLAN OF CARE
Followup with PMD and take all medications prescribed. Followup with PMD and take all medications prescribed. Low salt, low fat, low cholesterol, diabetic diet. Followup with PMD and take all medications prescribed. Followup with Gastroenterology. Followup with PMD and take all medications prescribed. Anticoagulation contraindicated due to cirrhosis of the liver.

## 2017-08-18 NOTE — DISCHARGE NOTE ADULT - CARE PLAN
Principal Discharge DX:	Acute on chronic diastolic congestive heart failure  Goal:	Followup with PMD and take all medications prescribed.  Instructions for follow-up, activity and diet:	Followup with PMD and take all medications prescribed. Low salt, low fat, low cholesterol, diabetic diet.  Secondary Diagnosis:	Liver disease  Goal:	Followup with PMD and take all medications prescribed.  Instructions for follow-up, activity and diet:	Followup with PMD and take all medications prescribed. Followup with Gastroenterology.  Secondary Diagnosis:	Atrial fibrillation, unspecified type  Goal:	Followup with PMD and take all medications prescribed.  Instructions for follow-up, activity and diet:	Followup with PMD and take all medications prescribed. Anticoagulation contraindicated due to cirrhosis of the liver.

## 2017-08-18 NOTE — DISCHARGE NOTE ADULT - CARE PROVIDER_API CALL
[facility] Medicine and Cardiology clinic,   Phone: [phone]  Fax: (   )    -

## 2017-08-18 NOTE — DISCHARGE NOTE ADULT - MEDICATION SUMMARY - MEDICATIONS TO TAKE
I will START or STAY ON the medications listed below when I get home from the hospital:    aspirin 81 mg oral tablet, chewable  -- 1 tab(s) by mouth once a day  -- Indication: For Heart health    Lasix 40 mg oral tablet  -- 1 tab(s) by mouth 2 times a day  -- Indication: For Heart failure    guaiFENesin 100 mg/5 mL oral liquid  -- 5 milliliter(s) by mouth every 6 hours, As needed, Cough  -- Indication: For Cough    docusate sodium 100 mg oral capsule  -- 1 cap(s) by mouth 3 times a day  -- Indication: For Laxative    polyethylene glycol 3350 oral powder for reconstitution  -- 17 gram(s) by mouth once a day  -- Indication: For Laxative I will START or STAY ON the medications listed below when I get home from the hospital:    aspirin 81 mg oral tablet, chewable  -- 1 tab(s) by mouth once a day  -- Indication: For Heart health    Lasix 40 mg oral tablet  -- 1 tab(s) by mouth 2 times a day  -- Indication: For Heart failure    guaiFENesin 100 mg/5 mL oral liquid  -- 5 milliliter(s) by mouth every 6 hours, As needed, Cough  -- Indication: For Cough    docusate sodium 100 mg oral capsule  -- 1 cap(s) by mouth 3 times a day  -- Indication: For Laxative    polyethylene glycol 3350 oral powder for reconstitution  -- 17 gram(s) by mouth once a day  -- Indication: For Laxative    K-Tab 10 mEq oral tablet, extended release  -- 1 tab(s) by mouth once a day  -- It is very important that you take or use this exactly as directed.  Do not skip doses or discontinue unless directed by your doctor.  Medication should be taken with plenty of water.  Take with food or milk.    -- Indication: For supplement

## 2017-08-18 NOTE — DISCHARGE NOTE ADULT - HOSPITAL COURSE
88 y/o male with a PMHx of unspecified heart failure, cirrhosis (diagnosed and managed in [place]), and HTN, who [place] ago, presents to ED with shortness of breath, lower extremity edema, and abdominal distention, likely secondary to acute on chronic heart failure complicated by new atrial fibrillation with bradycardia.    + New onset atrial fibrillation with braycardia- S/P Heparin gtt (held due to anemia and possible cirrhosis), Metoprolol held due to bradycardia to low 30s  + Acute on chronic unspecified CHF exacerbation- on Lasix 40mg IVP BID, pending echo  + Normocytic anemia- GI following ([employer]), no overt GI bleeding, likely in setting of chronic inflammation vs cirrhosis, monitor CBC  + Transaminitis- GI following ([employer]), likely from congestive hepatopathy vs cirrhosis, statin on hold  EKG: Atrial fibrillation at 84 bpm, RBBB, Q waves II, III, AVF  CE x2: Negative  CXR: The cardiomediastinal silhouette is enlarged in size. Mild increased interstitial markings may be related to pulmonary edema.  H/H: 9.9/33.5  Platelets: 148  Occult: Negative  D-dimer: 702  BUN/Cr: 26/1.40  Glucose: 134  ProBNP: 3036     8/13 Cards: CV status appears stable. Continue IV Lasix. D/C Zaroxolyn. Elevated INR and worsen H/H with possible liver disease. Hold Heparin gtt and start low dose ASA. If pt not on Coumadin, then call GI consult to evaluate for anemia. Echocardiogram ordered.  8/13 GI consult ([employer]): Normocytic anemia. No overt GI bleeding. Likely in setting of chronic inflammation. Volume overload. Differental diagnosis includes decompensated heart failure vs less likely decompensated cirrhosis. Elevated liver enzymes and transaminsases could be congestive hepatopathy vs 2/2 cirrhosis. Plan to check iron panel and ferritin for anemia eval. Do not think patient is having occult GI bleeding but may benefit from EGD/colonoscopy given history of cirrhosis and never having had in past, not urgent. Will discuss in AM would followup abdominal US to eval liver and ascites. Check hepatitis panel. Consider outpatient management of chronic liver disease. Avoid hepatotoxic agents.    8/14 Cards note: Acute on chronic heart failure. Volume status mildly improved. Continue IV Lasix as ordered. If not significantly negative, will increase to 40mg. Echo. Atrial fibrillation. Bradycardia noted on BB. Hold Metoprolol for now. Continue tele. Hold A/C given elevated INR and liver dysfunction. Continue ASA only. Liver disease and ascites secondary to RHF. GI follow up. CKD. Monitor renal function. Check UA.   8/14 US abdomen: Cirrhosis, likely cardiogenic. Marked dilatation of the inferior vena cava consistent with severe CHF. Bilateral pleural effusions and small amount ascites.    8/15 GI note: Cirrhosis likely due to CHF. Volume status and symptoms improving on diuretics. Low salt diet. Lasix 20mg IVP BID. Viral serologies negative. US revealed cirrhosis, dilated IVC and normal size spleen. Beta blocker held due to episodes of bradycardia. Constipation. Use Miralax daily rather than PRN. Ascites may not be enough to tap. Chronic anemia. Obtain iron studies, Vitamin B12, and folate. EGD and colonoscopy depends upon patient cardiac status, pending echo results.    8/15 Cards note: Acute on chronic CHF. Physical exam consistent with right-sided heart failure. Volume status mildly improved, but still grossly fluid overloaded on exam. Increase Lasix to 40mg IV BID for inadequate UOP. Await echo. AF rate controlled without AV cristi blockade. Continue tele. Echo. Hold A/C given elevated INR and liver dysfunction. ASA only for now. Liver disease likely due to right sided CHF with hepatic congestion.  GI evaluation. CKD. Continue to monitor. If creatinine continues to worsen, may need inotropic assisted diuresis. Check UA for protein. Hypokalemia in the setting of diuresis.  Replete K.    8/16- GI note- Cirrhosis : likely due to CHF, Volume status and symptoms improving on Diuretics, Low salt Diet, on lasix. Viral serologies negative. U/S revealed cirrhosis, dilated IVC and normal size spleen. Albumin also normal argues against Cirrhosis  Beta blocker held due to episodes of bradycardia. Constipation ; - on Colace, Dulcolax and Miralax.   - No BM yet, on daily doses of Miralax.  Ascites : Minimal ascites on physical exam and on U/S.  - May not be enough to tap. Chronic Anemia : needs iron studies, Vitamin B12 level, Folate. - EGD and Colonoscopy depends upon patient cardiac status , pending echo results.  A Fib & CHF : plan as per cardiology, pending echo results  8/16- Echo- EF 64%, Mitral annular calcification, otherwise normal mitral valve. Moderate mitral regurgitation. Calcified trileaflet aortic valve with normal opening. Mild aortic regurgitation. Severely dilated left atrium.  LA volume index = 74 cc/m2. Normal left ventricular internal dimensions and wall thicknesses. Endocardium not well visualized; grossly normal left ventricular systolic function. Severe right atrial enlargement. Right ventricular enlargement with decreased right ventricular systolic function. Normal tricuspid valve with malcoaptation of the leaflets.  Severe tricuspid regurgitation.  8/17- As per attending, patient stable for discharge.

## 2017-08-18 NOTE — PROGRESS NOTE ADULT - SUBJECTIVE AND OBJECTIVE BOX
CARDIOLOGY FOLLOW UP - Dr. Li    CC no chest pain or sob       PHYSICAL EXAM:  T(C): 37.2 (08-18-17 @ 13:37), Max: 37.2 (08-18-17 @ 13:37)  HR: 89 (08-18-17 @ 13:37) (76 - 89)  BP: 118/84 (08-18-17 @ 13:37) (102/73 - 118/84)  RR: 16 (08-18-17 @ 13:37) (16 - 17)  SpO2: 98% (08-18-17 @ 13:37) (98% - 100%)  Wt(kg): --  I&O's Summary    17 Aug 2017 07:01  -  18 Aug 2017 07:00  --------------------------------------------------------  IN: 627 mL / OUT: 200 mL / NET: 427 mL    18 Aug 2017 07:01  -  18 Aug 2017 14:09  --------------------------------------------------------  IN: 200 mL / OUT: 0 mL / NET: 200 mL        Appearance: Normal	  Cardiovascular: Normal S1 S2, irregular , No JVD, + murmurs  Respiratory: Lungs clear to auscultation	, diminished at base bl   Gastrointestinal:  Soft, Non-tender, + BS	  Extremities: Normal range of motion, + 1-2 bl LE  edema        MEDICATIONS  (STANDING):  sodium chloride 0.9% lock flush 3 milliLiter(s) IV Push every 8 hours  aspirin  chewable 81 milliGRAM(s) Oral daily  docusate sodium 100 milliGRAM(s) Oral three times a day  insulin lispro (HumaLOG) corrective regimen sliding scale   SubCutaneous three times a day before meals  insulin lispro (HumaLOG) corrective regimen sliding scale   SubCutaneous at bedtime  dextrose 5%. 1000 milliLiter(s) (50 mL/Hr) IV Continuous <Continuous>  dextrose 50% Injectable 12.5 Gram(s) IV Push once  dextrose 50% Injectable 25 Gram(s) IV Push once  dextrose 50% Injectable 25 Gram(s) IV Push once  furosemide   Injectable 40 milliGRAM(s) IV Push every 12 hours  polyethylene glycol 3350 17 Gram(s) Oral daily      TELEMETRY: 	afib HR 80s     ECG:  	  RADIOLOGY:   DIAGNOSTIC TESTING:  [ x ] Echocardiogram:  < from: Transthoracic Echocardiogram (08.16.17 @ 14:30) >  ------------------------------------------------------------------------  CONCLUSIONS:  1. Mitral annular calcification, otherwise normal mitral  valve.Moderate mitral regurgitation.  2. Calcified trileaflet aortic valve with normal opening.  Mild aortic regurgitation.  3. Severely dilated left atrium.  LA volume index = 74  cc/m2.  4. Normal left ventricular internal dimensions and wall  thicknesses.  5. Endocardium not well visualized; grossly normal left  ventricular systolic function.  6. Severe right atrial enlargement.  7. Right ventricular enlargement with decreased right  ventricular systolic function.  8. Normal tricuspid valve with malcoaptation of the  leaflets.  Severe tricuspid regurgitation.  *** No previous Echo exam.  ------------------------------------------------------------------------    < end of copied text >    [ ]  Catheterization:  [ ] Stress Test:    OTHER: 	    LABS:	 	                                9.0    5.58  )-----------( 123      ( 18 Aug 2017 07:22 )             29.9     08-18    138  |  95<L>  |  45<H>  ----------------------------<  121<H>  3.9   |  27  |  1.46<H>    Ca    9.6      18 Aug 2017 07:22  Phos  3.4     08-18  Mg     2.2     08-18      PT/INR - ( 18 Aug 2017 07:22 )   PT: 15.9 SEC;   INR: 1.41

## 2017-08-18 NOTE — PROGRESS NOTE ADULT - ASSESSMENT
89 year old man with history of CHF, liver disease(cirrhotic?), admitted with decompensated CHF and atrial fibrillation.        1. Acute/Chronic Diastolic CHF, acute right heart failure  volume status improving   change lasix to 40 mg po BID  echo with normal lV fxn  right sided dysfxn, severe tr  dc with 10 meq potassium daily     2. AFIB  bradycardia resolved off bb  no tachycardia  hold bb for now  cont to monitor on tele  no a/c   continue with asa     3 . Liver Disease  ascites  ? sec to RHF    gi outpt f.u           4. CKD  NAWAF  cont to monitor    5. anemia   cont to monitor    d/c planning with outpt diuretics, f.u with [facility] cardiac  clinic     dvt ppx

## 2021-11-18 NOTE — PHYSICAL THERAPY INITIAL EVALUATION ADULT - FOLLOWS COMMANDS/ANSWERS QUESTIONS, REHAB EVAL
100% of the time/able to follow single-step instructions Terbinafine Counseling: Patient counseling regarding adverse effects of terbinafine including but not limited to headache, diarrhea, rash, upset stomach, liver function test abnormalities, itching, taste/smell disturbance, nausea, abdominal pain, and flatulence.  There is a rare possibility of liver failure that can occur when taking terbinafine.  The patient understands that a baseline LFT and kidney function test may be required. The patient verbalized understanding of the proper use and possible adverse effects of terbinafine.  All of the patient's questions and concerns were addressed.

## 2022-12-02 NOTE — PROGRESS NOTE ADULT - ATTENDING COMMENTS
12/02/22                            [facility] SHEKHAR Smith   [address]  [place] [address]    To Whom It May Concern:    This is to certify Dudley Smith was evaluated with Ulysses S Boco, MD on 12/02/22 . Will need at least 3 days of rest        Electronically signed by:  Ulysses S Boco, MD  [facility]  [address]  [facility] [address]  Dept Phone: [phone]     
An 88 yo M with PMH of CHF, Chronic Anemia, ? Cryptogenic Cirrhosis and new onset A Fib is being followed for cirrhosis.    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 He has responded to IV furosemide and dyspnea has improved significantly.   Workup for chronic liver disease with cirrhosis so far is unrevealing.  He is awake, alert and oriented x3. He has no asterixis.  He has minimal ascites not amendable to safe diagnostic paracentesis.  Await official echocardiogram report.   Continue care per primary team and cardiology.
An 88 yo M with PMH of CHF, Chronic Anemia, ? Cryptogenic Cirrhosis and new onset A Fib is being followed for cirrhosis.     He has responded to IV furosemide and dyspnea has improved significantly.   Workup for chronic liver disease with cirrhosis so far is unrevealing.  He is awake, alert and oriented x3. He has no asterixis.  He has minimal ascites not amendable to safe diagnostic paracentesis.  Echo showed severe tricuspid regurgitation, enlarged right atrial and right ventricle and RV dysfunction.  Workup for cirrhosis was notable for positive DANA but of unclear significance.   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
An 89 year old man with history of heart failure, leg edema, dyspnea, new onset atrial fibrillation, chronic anemia is being followed for cryptogenic cirrhosis, abnormal liver tests and prolonged INR.   Patient is awake, alert and oriented x3. He has minimal ascites. BNP is high. Abdominal US revealed cirrhosis and dilated IVC  Dyspnea improved after IV lasix.  Patient denies overt GI bleeding.   He likely has congestive hepatopathy or cardiac cirrhosis  Will trend CBC, liver tests and INR, await echocardiogram.
Patient seen and examined.  Agree with above NP note.    cv stable and improved  stable for d/c home   lasix bid  asa  kcl  outpt f/u with clinic  d/w son via phone  no ischemic eval sec to age and ckd

## 2023-01-26 NOTE — PATIENT PROFILE ADULT. - HAS THE PATIENT HAD A SIGNIFICANT CHANGE IN FUNCTIONAL STATUS DUE TO CVA, HEAD TRAUMA, ORTHOPEDIC TRAUMA/SURGERY, OR FALL, WITH THE WEEK PRIOR TO ADMISSION
RVP is positive for Entero/rhinovirus  CXR 1/26 shows Chronic changes of the right upper lobe  New hazy airspace opacities in the left upper lobe more consistent with active infection than heart failure  - Possible viral pneumonia  - No definite e/o superimposed bacterial infection  - Will c/w IV zosyn for now until further information is available  - Milagros ATC  - Prednisone 20mg QD x 5 days  - Symptomatic treatment with tessalon/robitussin as needed  - F/up MRSA nasal swab  - F/up Blood and sputum cultures  - F/up procalcitonin  - If all cultures and procalcitonin are negative can DC zosyn no

## 2025-01-10 NOTE — ED ADULT TRIAGE NOTE - CHIEF COMPLAINT QUOTE
Health Maintenance       Microalbumin Ratio (Yearly)  Ordered on 4/30/2024    Medicare Advantage- Medicare Wellness Visit (Yearly - January to December)  Due since 1/1/2025           Following review of the above:  Health maintenance topics up to date.    Note: Refer to final orders and clinician documentation.    HRA:  3.) During the past 4 weeks, how would you rate your health?: Fair     4.) During the past 4 weeks, what was the hardest physical activity you could do for at least 2 minutes?: Light     5.) Do you do moderate to strenuous exercise (brisk walk) for about 20 minutes for 3 or more days per week?: No, I usually do not exercise this much     7a.) Have you had a fall in the past year?: Yes     7c.) Do you worry about falling?: Yes     8.) During the past 4 weeks, has your physical and emotional health limited your social activities with family, friends, neighbors, or other groups?: Quite a bit     11d.) Bodily pain: Often     11e.) Tiredness or Fatigue: Often     11f.) Feeling stressed or overwhelmed: Often     14.) During the past 4 weeks, was someone available to help if you needed and wanted help?: Yes, a little     15.) How confident are you that you can control and manage most of your health problems?: Somewhat confident             Review Flowsheet  More data exists         1/10/2025   PHQ 2/9 Score   Adult PHQ 2 Score 2   Adult PHQ 2 Interpretation No further screening needed   Little interest or pleasure in activity? Several days   Feeling down, depressed or hopeless? Several days      Details                     Advance care planning documents on file - yes     Pt comes in for c/o CP/SOB  and in ability to ambulate x3 day. Pt appears in no acute distress, some mild wheezing noted in triage. Family translating for pt and reports there pt arrived from [place] 3 wks ago and was being treated for multiple things there. Pt vs as noted, EKG in completed.